# Patient Record
Sex: MALE | Race: WHITE | NOT HISPANIC OR LATINO | Employment: FULL TIME | URBAN - METROPOLITAN AREA
[De-identification: names, ages, dates, MRNs, and addresses within clinical notes are randomized per-mention and may not be internally consistent; named-entity substitution may affect disease eponyms.]

---

## 2017-06-28 ENCOUNTER — GENERIC CONVERSION - ENCOUNTER (OUTPATIENT)
Dept: OTHER | Facility: OTHER | Age: 57
End: 2017-06-28

## 2017-06-29 ENCOUNTER — LAB REQUISITION (OUTPATIENT)
Dept: LAB | Facility: HOSPITAL | Age: 57
End: 2017-06-29
Payer: COMMERCIAL

## 2017-06-29 DIAGNOSIS — N20.1 CALCULUS OF URETER: ICD-10-CM

## 2017-06-29 PROCEDURE — 82360 CALCULUS ASSAY QUANT: CPT | Performed by: UROLOGY

## 2017-07-03 ENCOUNTER — ALLSCRIPTS OFFICE VISIT (OUTPATIENT)
Dept: OTHER | Facility: OTHER | Age: 57
End: 2017-07-03

## 2017-07-03 DIAGNOSIS — Z12.5 ENCOUNTER FOR SCREENING FOR MALIGNANT NEOPLASM OF PROSTATE: ICD-10-CM

## 2017-07-03 DIAGNOSIS — N40.0 ENLARGED PROSTATE WITHOUT LOWER URINARY TRACT SYMPTOMS (LUTS): ICD-10-CM

## 2017-07-03 LAB
BILIRUB UR QL STRIP: NORMAL
CLARITY UR: NORMAL
COLOR UR: YELLOW
GLUCOSE (HISTORICAL): NORMAL
HGB UR QL STRIP.AUTO: NORMAL
KETONES UR STRIP-MCNC: NORMAL MG/DL
LEUKOCYTE ESTERASE UR QL STRIP: NORMAL
NITRITE UR QL STRIP: NORMAL
SP GR UR STRIP.AUTO: 1.02

## 2017-07-06 ENCOUNTER — TRANSCRIBE ORDERS (OUTPATIENT)
Dept: ADMINISTRATIVE | Facility: HOSPITAL | Age: 57
End: 2017-07-06

## 2017-07-06 ENCOUNTER — GENERIC CONVERSION - ENCOUNTER (OUTPATIENT)
Dept: OTHER | Facility: OTHER | Age: 57
End: 2017-07-06

## 2017-07-06 ENCOUNTER — APPOINTMENT (OUTPATIENT)
Dept: LAB | Facility: HOSPITAL | Age: 57
End: 2017-07-06
Payer: COMMERCIAL

## 2017-07-06 DIAGNOSIS — K29.70 GASTRITIS WITHOUT BLEEDING: ICD-10-CM

## 2017-07-06 DIAGNOSIS — I25.10 ATHEROSCLEROTIC HEART DISEASE OF NATIVE CORONARY ARTERY WITHOUT ANGINA PECTORIS: ICD-10-CM

## 2017-07-06 DIAGNOSIS — Z12.5 ENCOUNTER FOR SCREENING FOR MALIGNANT NEOPLASM OF PROSTATE: ICD-10-CM

## 2017-07-06 DIAGNOSIS — R31.29 OTHER MICROSCOPIC HEMATURIA: ICD-10-CM

## 2017-07-06 DIAGNOSIS — E55.9 VITAMIN D DEFICIENCY: ICD-10-CM

## 2017-07-06 DIAGNOSIS — R73.9 HYPERGLYCEMIA: ICD-10-CM

## 2017-07-06 DIAGNOSIS — E78.5 HYPERLIPIDEMIA: ICD-10-CM

## 2017-07-06 DIAGNOSIS — I47.1 SUPRAVENTRICULAR TACHYCARDIA (HCC): ICD-10-CM

## 2017-07-06 LAB
25(OH)D3 SERPL-MCNC: 31.8 NG/ML (ref 30–100)
ALBUMIN SERPL BCP-MCNC: 4 G/DL (ref 3.5–5)
ALP SERPL-CCNC: 83 U/L (ref 46–116)
ALT SERPL W P-5'-P-CCNC: 51 U/L (ref 12–78)
ANION GAP SERPL CALCULATED.3IONS-SCNC: 7 MMOL/L (ref 4–13)
AST SERPL W P-5'-P-CCNC: 22 U/L (ref 5–45)
BILIRUB SERPL-MCNC: 0.7 MG/DL (ref 0.2–1)
BUN SERPL-MCNC: 19 MG/DL (ref 5–25)
CALCIUM SERPL-MCNC: 9 MG/DL (ref 8.3–10.1)
CHLORIDE SERPL-SCNC: 104 MMOL/L (ref 100–108)
CHOLEST SERPL-MCNC: 115 MG/DL (ref 50–200)
CO2 SERPL-SCNC: 30 MMOL/L (ref 21–32)
CREAT SERPL-MCNC: 1.14 MG/DL (ref 0.6–1.3)
ERYTHROCYTE [DISTWIDTH] IN BLOOD BY AUTOMATED COUNT: 13.4 % (ref 11.6–15.1)
EST. AVERAGE GLUCOSE BLD GHB EST-MCNC: 117 MG/DL
GFR SERPL CREATININE-BSD FRML MDRD: >60 ML/MIN/1.73SQ M
GLUCOSE P FAST SERPL-MCNC: 102 MG/DL (ref 65–99)
HBA1C MFR BLD: 5.7 % (ref 4.2–6.3)
HCT VFR BLD AUTO: 45.9 % (ref 36.5–49.3)
HDLC SERPL-MCNC: 43 MG/DL (ref 40–60)
HGB BLD-MCNC: 15.9 G/DL (ref 12–17)
LDLC SERPL CALC-MCNC: 55 MG/DL (ref 0–100)
MCH RBC QN AUTO: 30.9 PG (ref 26.8–34.3)
MCHC RBC AUTO-ENTMCNC: 34.6 G/DL (ref 31.4–37.4)
MCV RBC AUTO: 89 FL (ref 82–98)
PLATELET # BLD AUTO: 194 THOUSANDS/UL (ref 149–390)
PMV BLD AUTO: 10.5 FL (ref 8.9–12.7)
POTASSIUM SERPL-SCNC: 4.3 MMOL/L (ref 3.5–5.3)
PROT SERPL-MCNC: 7.3 G/DL (ref 6.4–8.2)
PSA SERPL-MCNC: 0.4 NG/ML (ref 0–4)
RBC # BLD AUTO: 5.15 MILLION/UL (ref 3.88–5.62)
SODIUM SERPL-SCNC: 141 MMOL/L (ref 136–145)
TRIGL SERPL-MCNC: 86 MG/DL
TSH SERPL DL<=0.05 MIU/L-ACNC: 1.03 UIU/ML (ref 0.36–3.74)
WBC # BLD AUTO: 7.51 THOUSAND/UL (ref 4.31–10.16)

## 2017-07-06 PROCEDURE — 80053 COMPREHEN METABOLIC PANEL: CPT

## 2017-07-06 PROCEDURE — 83036 HEMOGLOBIN GLYCOSYLATED A1C: CPT

## 2017-07-06 PROCEDURE — G0103 PSA SCREENING: HCPCS

## 2017-07-06 PROCEDURE — 36415 COLL VENOUS BLD VENIPUNCTURE: CPT

## 2017-07-06 PROCEDURE — 84443 ASSAY THYROID STIM HORMONE: CPT

## 2017-07-06 PROCEDURE — 85027 COMPLETE CBC AUTOMATED: CPT

## 2017-07-06 PROCEDURE — 82306 VITAMIN D 25 HYDROXY: CPT

## 2017-07-06 PROCEDURE — 80061 LIPID PANEL: CPT

## 2017-07-08 DIAGNOSIS — K29.70 GASTRITIS WITHOUT BLEEDING: ICD-10-CM

## 2017-07-08 DIAGNOSIS — E78.5 HYPERLIPIDEMIA: ICD-10-CM

## 2017-07-08 DIAGNOSIS — R31.29 OTHER MICROSCOPIC HEMATURIA: ICD-10-CM

## 2017-07-08 DIAGNOSIS — Z12.5 ENCOUNTER FOR SCREENING FOR MALIGNANT NEOPLASM OF PROSTATE: ICD-10-CM

## 2017-07-08 DIAGNOSIS — I25.10 ATHEROSCLEROTIC HEART DISEASE OF NATIVE CORONARY ARTERY WITHOUT ANGINA PECTORIS: ICD-10-CM

## 2017-07-08 DIAGNOSIS — E55.9 VITAMIN D DEFICIENCY: ICD-10-CM

## 2017-07-08 DIAGNOSIS — R73.9 HYPERGLYCEMIA: ICD-10-CM

## 2017-07-08 DIAGNOSIS — I47.1 SUPRAVENTRICULAR TACHYCARDIA (HCC): ICD-10-CM

## 2017-07-12 LAB
CA PHOS MFR STONE: 20 %
COLOR STONE: NORMAL
COM MFR STONE: 80 %
COMMENT-STONE3: NORMAL
COMPOSITION: NORMAL
LABORATORY COMMENT REPORT: NORMAL
NIDUS STONE QL: NORMAL
PHOTO: NORMAL
SIZE STONE: NORMAL MM
STONE ANALYSIS-IMP: NORMAL
WT STONE: 8 MG

## 2017-08-08 ENCOUNTER — GENERIC CONVERSION - ENCOUNTER (OUTPATIENT)
Dept: OTHER | Facility: OTHER | Age: 57
End: 2017-08-08

## 2017-12-18 ENCOUNTER — GENERIC CONVERSION - ENCOUNTER (OUTPATIENT)
Dept: FAMILY MEDICINE CLINIC | Facility: HOSPITAL | Age: 57
End: 2017-12-18

## 2017-12-19 ENCOUNTER — GENERIC CONVERSION - ENCOUNTER (OUTPATIENT)
Dept: FAMILY MEDICINE CLINIC | Facility: HOSPITAL | Age: 57
End: 2017-12-19

## 2017-12-19 ENCOUNTER — GENERIC CONVERSION - ENCOUNTER (OUTPATIENT)
Dept: OTHER | Facility: OTHER | Age: 57
End: 2017-12-19

## 2017-12-20 ENCOUNTER — ALLSCRIPTS OFFICE VISIT (OUTPATIENT)
Dept: OTHER | Facility: OTHER | Age: 57
End: 2017-12-20

## 2017-12-20 ENCOUNTER — GENERIC CONVERSION - ENCOUNTER (OUTPATIENT)
Dept: FAMILY MEDICINE CLINIC | Facility: HOSPITAL | Age: 57
End: 2017-12-20

## 2017-12-20 LAB
BILIRUB UR QL STRIP: NEGATIVE
CLARITY UR: NORMAL
COLOR UR: YELLOW
GLUCOSE (HISTORICAL): NEGATIVE
HGB UR QL STRIP.AUTO: NEGATIVE
KETONES UR STRIP-MCNC: NEGATIVE MG/DL
LEUKOCYTE ESTERASE UR QL STRIP: NEGATIVE
NITRITE UR QL STRIP: NEGATIVE
PH UR STRIP.AUTO: 5.5 [PH]
PROT UR STRIP-MCNC: NEGATIVE MG/DL
SP GR UR STRIP.AUTO: >=1.03
UROBILINOGEN UR QL STRIP.AUTO: 0.2

## 2017-12-21 ENCOUNTER — GENERIC CONVERSION - ENCOUNTER (OUTPATIENT)
Dept: FAMILY MEDICINE CLINIC | Facility: HOSPITAL | Age: 57
End: 2017-12-21

## 2017-12-27 ENCOUNTER — GENERIC CONVERSION - ENCOUNTER (OUTPATIENT)
Dept: FAMILY MEDICINE CLINIC | Facility: HOSPITAL | Age: 57
End: 2017-12-27

## 2018-01-13 VITALS
WEIGHT: 219 LBS | BODY MASS INDEX: 35.2 KG/M2 | DIASTOLIC BLOOD PRESSURE: 80 MMHG | SYSTOLIC BLOOD PRESSURE: 120 MMHG | HEIGHT: 66 IN

## 2018-01-14 NOTE — RESULT NOTES
Discussion/Summary   please notify pt that  sugar was slightly elevated at 102 but rest of BW - kidney/liver/thyroid/cholesterol and prostate tests were all wnl, watch sugars/carbs, keep wgt healthy and try and exercise regularly     Verified Results  (1) CBC/ PLT (NO DIFF) [id number] 09:48AM Lorre Matter Order Number: [id number]     Test Name Result Flag Reference   HEMATOCRIT 45 9 %  36 5-49 3   HEMOGLOBIN 15 9 g/dL  12 0-17 0   MCHC 34 6 g/dL  31 4-37 4   MCH 30 9 pg  26 8-34 3   MCV 89 fL  82-98   PLATELET COUNT 785 Thousands/uL  149-390   RBC COUNT 5 15 Million/uL  3 88-5 62   RDW 13 4 %  11 6-15 1   WBC COUNT 7 51 Thousand/uL  4 31-10 16   MPV 10 5 fL  8 9-12 7     (1) COMPREHENSIVE METABOLIC PANEL 44JKJ5777 62:39TY Lorre Matter Order Number: [id number]     Test Name Result Flag Reference   SODIUM 141 mmol/L  136-145   POTASSIUM 4 3 mmol/L  3 5-5 3   CHLORIDE 104 mmol/L  100-108   CARBON DIOXIDE 30 mmol/L  21-32   ANION GAP (CALC) 7 mmol/L  4-13   BLOOD UREA NITROGEN 19 mg/dL  5-25   CREATININE 1 14 mg/dL  0 60-1 30   Standardized to IDMS reference method   CALCIUM 9 0 mg/dL  8 3-10 1   BILI, TOTAL 0 70 mg/dL  0 20-1 00   ALK PHOSPHATAS 83 U/L     ALT (SGPT) 51 U/L  12-78   AST(SGOT) 22 U/L  5-45   ALBUMIN 4 0 g/dL  3 5-5 0   TOTAL PROTEIN 7 3 g/dL  6 4-8 2   eGFR Non-African American      >60 0 ml/min/1 73sq m   [facility] [employer] Disease Education Program recommendations are as follows:  GFR calculation is accurate only with a steady state creatinine  Chronic Kidney disease less than 60 ml/min/1 73 sq  meters  Kidney failure less than 15 ml/min/1 73 sq  meters  GLUCOSE FASTING 102 mg/dL H 65-99     (1) HEMOGLOBIN A1C 62HMD0842 09:48AM Lorre Matter Order Number: [id number]     Test Name Result Flag Reference   HEMOGLOBIN A1C 5 7 %  4 2-6 3   EST  AVG   GLUCOSE 117 mg/dl       (1) LIPID PANEL, FASTING 32FZV4087 09:48AM Matt FREEMAN Order Number: [id number]     Test Name Result Flag Reference   CHOLESTEROL 115 mg/dL     HDL,DIRECT 43 mg/dL  40-60   Specimen collection should occur prior to Metamizole administration due to the potential for falsely depressed results  LDL CHOLESTEROL CALCULATED 55 mg/dL  0-100   Triglyceride:         Normal              <150 mg/dl       Borderline High    150-199 mg/dl       High               200-499 mg/dl       Very High          >499 mg/dl  Cholesterol:         Desirable        <200 mg/dl      Borderline High  200-239 mg/dl      High             >239 mg/dl  HDL Cholesterol:        High    >59 mg/dL      Low     <41 mg/dL  LDL CALCULATED:    This screening LDL is a calculated result  It does not have the accuracy of the Direct Measured LDL in the monitoring of patients with hyperlipidemia and/or statin therapy  Direct Measure LDL (QAQ335) must be ordered separately in these patients  TRIGLYCERIDES 86 mg/dL  <=150   Specimen collection should occur prior to N-Acetylcysteine or Metamizole administration due to the potential for falsely depressed results  (1) TSH WITH FT4 REFLEX 56Myy9616 09:48AM [facility] Order Number: [id number]     Test Name Result Flag Reference   TSH 1 030 uIU/mL  0 358-3 740   Patients undergoing fluorescein dye angiography may retain small amounts of fluorescein in the body for 48-72 hours post procedure  Samples containing fluorescein can produce falsely depressed TSH values  If the patient had this procedure,a specimen should be resubmitted post fluorescein clearance       (1) VITAMIN D 25-HYDROXY 47LWK0861 09:48AM [facility] Order Number: [id number]     Test Name Result Flag Reference   VIT D 25-HYDROX 31 8 ng/mL  30 0-100 0   This assay is a certified procedure of the CDC Vitamin D Standardization Certification Program (VDSCP)     Deficiency <20ng/ml   Insufficiency 20-30ng/ml   Sufficient  ng/ml     *Patients undergoing fluorescein dye angiography may retain small amounts of fluorescein in the body for 48-72 hours post procedure  Samples containing fluorescein can produce falsely elevated Vitamin D values  If the patient had this procedure, a specimen should be resubmitted post fluorescein clearance  (1) PSA (SCREEN) (Dx V76 44 Screen for Prostate Cancer) 62BJS6023 09:48AM Manish Yara Order Number: [id number]     Test Name Result Flag Reference   PROSTATE SPECIFIC ANTIGEN 0 4 ng/mL  0 0-4 0   American Urological Association Guidelines define biochemical recurrence of prostate cancer as a detectable or rising PSA value post-radical prostatectomy that is greater than or equal to 0 2 ng/mL with a second confirmatory level of greater than or equal to 0 2 ng/mL

## 2018-01-15 NOTE — PROGRESS NOTES
Assessment    1  Encounter for preventive health examination (V70 0) (Z00 00)   2  Hyperglycemia (790 29) (R73 9)   3  GERD (gastroesophageal reflux disease) (530 81) (K21 9)   4  Dyslipidemia (272 4) (E78 5)   5  Streptococcus pneumoniae vaccination indicated (V49 89) (Z91 89)   6  Frothy urine (791 9) (R82 99)    Plan  Frothy urine    · (1) URINALYSIS w URINE C/S REFLEX (will reflex a microscopy if leukocytes, occult  blood, or nitrites are not within normal limits); Status:Active; Requested for:15Qve2082;   Streptococcus pneumoniae vaccination indicated    · Pneumo (Pneumovax); INJECT 0 5  ML Intramuscular; To Be Done:  20EFY4275    Discussion/Summary  Impression: health maintenance visit  Currently, he eats a poor diet and has an inadequate exercise regimen  Prostate cancer screening: prostate cancer screening is current  Colorectal cancer screening: colorectal cancer screening is current and colonoscopy is needed every ten years  The immunizations will be given as outlined in the orders  Advice and education were given regarding nutrition, aerobic exercise, weight loss, sunscreen use and seat belt use  Patient discussion: discussed with the patient  BW results d/w pt and all values at goal - no changes in meds; Sugars better and low sugar/carb diet d/w pt in detail - will recheck in 1 yr    Frothy urine - pt very concerned over kidneys - will check UA but reassured kidney tests were nml    GERD - reassured pt that his renal fxn appeared nml and if the Prilosec worked better I would go back to that over the Ranitiidine - he has f/u with GI next week  The patient was counseled regarding diagnostic results, instructions for management, risk factor reductions, impressions  Chief Complaint  PE      History of Present Illness  HM, Adult Male: The patient is being seen for a health maintenance evaluation  The last health maintenance visit was 1 year(s) ago     Social History: Household members include spouse  He is   Work status: working full time  The patient has never smoked cigarettes  He reports rare alcohol use  General Health: The patient's health since the last visit is described as good  He has regular dental visits  He denies vision problems  He denies hearing loss  Immunizations status: not up to date   just saw dentist and had a cavity filled, he notes no vision problems and goes every 2 yrs - due next year, has reading glasses  Lifestyle:  He does not have a healthy diet  He does not exercise regularly  He does not use tobacco  He denies alcohol use  He denies drug use  admits diet has been poor since being home for visit but he states it is well balanced and good when he is in his regular environment in [place], he does no formal exercise but walks a lot in [place]  Reproductive health:  the patient is not sexually active  He denies erectile dysfunction  Screening: Prostate cancer screening includes last prostate-specific antigen testing 2016  Colorectal cancer screening includes last colonoscopy done 2014  Metabolic screening includes lipid profile performed July 2016, glucose screening performed July 2016 and thyroid function test performed July 2016  Cardiovascular risk factors: hypertension, low HDL cholesterol and obesity  General health risks: no elevated prostate-specific antigen and no family history of prostate cancer  Safety elements used: seat belt and sunscreen  Risk findings: no depression symptoms  HPI: He saw Derm and was told he had a precancerous skin lesion and is going tomorrow to have further tx done on it  Pt has been seeing a podiatrist for L foot pain and was told he had plantar fasciitis and was started on Naproxen 500 mg 1 tab PO bid  He notes the med is helping and he is doing stretches       Has been concerned over renal SE with Prilosec saw d/w GI and they changed him to Ranitidine - still having some symptoms with the Ranitidine and has f/u appt with GI next week to discuss further  Does note the Naproxen does upset his stomach when he takes it  Review of Systems    Constitutional: recent weight gain, but no fever and no chills  Eyes: no eyesight problems  ENT: no sore throat and no nasal discharge  Cardiovascular: no chest pain, no palpitations and no extremity edema  Respiratory: no shortness of breath, no cough and no wheezing  Gastrointestinal: no abdominal pain, no constipation and no diarrhea  Genitourinary: urine very frothy when he urinates, but no dysuria  Musculoskeletal: no joint swelling and no joint stiffness  Integumentary: no rashes  Neurological: no headache and no dizziness  Psychiatric: no depression  Endocrine: no muscle weakness and no erectile dysfunction  Hematologic/Lymphatic: no tendency for easy bleeding and no tendency for easy bruising  Active Problems    1  CAD (coronary atherosclerotic disease) (414 00) (I25 10)   2  Cervicalgia (723 1) (M54 2)   3  Colonoscopy (Fiberoptic)   4  Colonoscopy (Fiberoptic) Screening   5  Dyslipidemia (272 4) (E78 5)   6  Edema (782 3) (R60 9)   7  Erectile dysfunction (607 84) (N52 9)   8  Facet hypertrophy of lumbosacral region (724 8) (M47 897)   9  Gastritis (535 50) (K29 70)   10  GERD (gastroesophageal reflux disease) (530 81) (K21 9)   11  Hyperglycemia (790 29) (R73 9)   12  Influenza vaccine needed (V04 81) (Z23)   13  Internal Hemorrhoids (455 0)   14  Loss of appetite (783 0) (R63 0)   15  Low back pain (724 2) (M54 5)   16  Lumbosacral pain (724 2,724 6) (M54 5,M54 89)   17  Microscopic hematuria (599 72) (R31 2)   18  Narrowing of lumbar intervertebral disc space (722 52) (M51 36)   19  Neck muscle spasm (728 85) (M62 838)   20  Obesity (278 00) (E66 9)   21  Osteoarthritis cervical spine (721 0) (M47 812)   22  Paresthesia (782 0) (R20 2)   23  Paroxysmal SVT (supraventricular tachycardia) (427 0) (I47 1)   24  Polyuria (788 42) (R35 8)   25  Rhinitis (472 0) (J31 0)   26  Sacral radiculopathy (724 4) (M54 18)   27  Screening for prostate cancer (V76 44) (Z12 5)   28  Trochanteric bursitis of both hips (726 5) (M70 61,M70 62)   29  Vitamin D deficiency (268 9) (E55 9)    Past Medical History    · History of Abdominal bloating (787 3) (R14 0)   · History of Acute hepatitis A (070 1) (B15 9)   · History of Dry mouth (527 7) (R68 2)   · History of Dysphagia (787 20) (R13 10)   · History of Exposure To Radiation   · History of Hip pain (719 45) (M25 559)   · History of abdominal pain (V13 89) (N54 882)   · History of prostatitis (V13 89) (R78 995)   · History of Left leg pain (729 5) (M79 605)   · History of Nephrolithiasis (V13 01)   · History of Plantar fasciitis (728 71) (M72 2)   · History of Skin rash (782 1) (R21)   · History of Testicular pain (608 9) (N50 8)    Surgical History    · History of Cath Stent Placement   · History of Cath Stent Placement   · History of Complete Colonoscopy   · History of Complete Colonoscopy   · History of Dental Surgery   · History of Diagnostic Esophagogastroduodenoscopy   · History of Diagnostic Esophagogastroduodenoscopy   · History of Needle Biopsy Of Prostate    Family History  Mother    · Family history of Breast Cancer (V16 3)   · Family history of Hyperlipidemia   · Family history of Hypertension (V17 49)   · Family history of Osteoporosis (V17 81)  Father    · Family history of Hypertension (V17 49)  Paternal Grandfather    · Family history of Coronary Artery Disease (V17 49)    Social History    · Marital History - Currently    · Never A Smoker   · Occasional alcohol use   · Working Full Time   ·     Current Meds   1  Allegra Allergy 180 MG Oral Tablet; TAKE 1 TABLET DAILY AS NEEDED FOR   ALLERGIES; Therapy: 54QPV8856 to (Last XE:88MSP8054)  Requested for: 48SVE2502 Ordered   2  Aspirin 81 MG TABS; TAKE 1 TABLET DAILY; Therapy: 01WND3196 to (Evaluate:83See4782);  Last BQ:58YZD0627 Ordered   3  Carafate 1 GM Oral Tablet; TAKE 1 TABLET EVERY 12 HOURS; Therapy: 23Apr2015 to Recorded   4  Clopidogrel Bisulfate 75 MG Oral Tablet; TAKE 1 TABLET EVERY DAY; Therapy: 13ZLO6962 to (Evaluate:58Vwd9838); Last Rx:21Jun2012 Ordered   5  Metoprolol Tartrate 25 MG Oral Tablet; TAKE 1 TABLET TWICE DAILY; Therapy: 48KPB3490 to (Evaluate:12Nov2015)  Requested for: 16Apr2015; Last   Rx:16Apr2015 Ordered   6  Ranitidine HCl - 150 MG Oral Tablet; TAKE ONE (1) TABLET(S) TWICE DAILY; Therapy: 56AQP0837 to (Evaluate:70Ymp3192) Recorded   7  Vitamin D 2000 UNIT Oral Tablet; take 2 tablet daily; Therapy: 50HCX4381 to Recorded   8  Zetia 10 MG Oral Tablet; TAKE 1 TABLET DAILY; Therapy: 19XFR5012 to (Evaluate:16Jun2013)  Requested for: 21Jun2012; Last   Rx:21Jun2012 Ordered    Allergies    1  No Known Drug Allergies    Vitals   Recorded: 50OGX5588 78:93HM   Systolic 318   Diastolic 72   Heart Rate 72   Temperature 97 5 F   Height 5 ft 6 in   Weight 212 lb    BMI Calculated 34 22   BSA Calculated 2 06     Physical Exam    Constitutional   General appearance: No acute distress, well appearing and well nourished  Ears, Nose, Mouth, and Throat   External inspection of ears and nose: Normal     Otoscopic examination: Tympanic membranes translucent with normal light reflex  Canals patent without erythema  Oropharynx: Normal with no erythema, edema, exudate or lesions  Neck   Neck: Supple, symmetric, trachea midline, no masses  Pulmonary   Respiratory effort: No increased work of breathing or signs of respiratory distress  Auscultation of lungs: Clear to auscultation  Cardiovascular   Auscultation of heart: Normal rate and rhythm, normal S1 and S2, no murmurs  Examination of extremities for edema and/or varicosities: Normal     Abdomen   Abdomen: Non-tender, no masses  Lymphatic   Palpation of lymph nodes in neck: No lymphadenopathy      Musculoskeletal   Gait and station: Normal  Psychiatric   Judgment and insight: Normal     Mood and affect: Normal        Health Management  Colonoscopy (Fiberoptic) Screening   COLONOSCOPY; every 10 years; Last 60Roy8692; Next Due: 93BTX8034;  Active    Signatures   Electronically signed by : Femi Melendez DO; Jul 18 2016 10:05AM EST                       (Author)

## 2018-01-16 NOTE — RESULT NOTES
Message   pt aware     Verified Results  (1) URINALYSIS w URINE C/S REFLEX (will reflex a microscopy if leukocytes, occult blood, or nitrites are not within normal limits) 86Uxe7101 10:18AM Ed Mora Order Number: [id number]     Test Name Result Flag Reference   COLOR Yellow     CLARITY Clear     PH UA 5 0  4 5-8 0   LEUKOCYTE ESTERASE UA Negative  Negative   NITRITE UA Negative  Negative   PROTEIN UA Negative mg/dl  Negative   GLUCOSE UA Negative mg/dl  Negative   KETONES UA Negative mg/dl  Negative   UROBILINOGEN UA 0 2 E U /dl  0 2, 1 0 E U /dl   BILIRUBIN UA Negative  Negative   BLOOD UA Trace-Intact A Negative   SPECIFIC GRAVITY UA >=1 030  1 003-1 030   BACTERIA None Seen /hpf  None Seen, Occasional   EPITHELIAL CELLS None Seen /hpf  None Seen, Occasional   RBC UA 0-1 /hpf A None Seen   WBC UA None Seen /hpf  None Seen   MUCOUS THREADS Occasional  Occasional, Moderate, Innumerable       Discussion/Summary   please notify pt that his urine showed no sign of protein in it at all

## 2018-01-17 NOTE — MISCELLANEOUS
Message   Recorded as Task   Date: 08/08/2017 09:47 AM, Created By: Kami Jasso   Task Name: Miscellaneous   Assigned To: Selvin NORRIS,TEAM   Regarding Patient: Henrique Monroy, Status: Active   CommentJola Snellen - 08 Aug 2017 9:47 AM     TASK CREATED  Caller: Self; [phone] (Home); [phone] (Work)  Pt calling from [place] via [employer] for stone analysis- he will be calling back at 10 am our time to speak with triage as it will be 11 pm there  Azeb Roque - 08 Aug 2017 10:32 AM     TASK EDITED  Pt  given results  Copy of analysis and Calcium Oxalate diet mailed to [facility] address  Pt  will not be home from [place] until Dec  for appt  Active Problems    1  Benign prostatic hyperplasia without lower urinary tract symptoms (600 00) (N40 0)   2  BPH (benign prostatic hyperplasia) (600 00) (N40 0)   3  CAD (coronary atherosclerotic disease) (414 00) (I25 10)   4  Calcium kidney stones (592 0) (N20 0)   5  Cervicalgia (723 1) (M54 2)   6  Colonoscopy (Fiberoptic)   7  Colonoscopy (Fiberoptic) Screening   8  Dyslipidemia (272 4) (E78 5)   9  Edema (782 3) (R60 9)   10  Encounter for screening for malignant neoplasm of prostate (V76 44) (Z12 5)   11  Erectile dysfunction (607 84) (N52 9)   12  Facet hypertrophy of lumbosacral region (724 8) (M47 897)   13  Frothy urine (791 9) (R82 99)   14  Gastritis (535 50) (K29 70)   15  GERD (gastroesophageal reflux disease) (530 81) (K21 9)   16  Hyperglycemia (790 29) (R73 9)   17  Influenza vaccine needed (V04 81) (Z23)   18  Internal Hemorrhoids (455 0)   19  Loss of appetite (783 0) (R63 0)   20  Low back pain (724 2) (M54 5)   21  Lumbosacral pain (724 2,724 6) (M54 5)   22  Microscopic hematuria (599 72) (R31 29)   23  Narrowing of lumbar intervertebral disc space (722 52) (M51 36)   24  Neck muscle spasm (728 85) (M62 838)   25  Obesity (278 00) (E66 9)   26  Osteoarthritis cervical spine (721 0) (M47 812)   27   Paresthesia (782  0) (R20 2)   28  Paroxysmal SVT (supraventricular tachycardia) (427 0) (I47 1)   29  Polyuria (788 42) (R35 8)   30  Rhinitis (472 0) (J31 0)   31  Sacral radiculopathy (724 4) (M54 18)   32  Screening for prostate cancer (V76 44) (Z12 5)   33  Streptococcus pneumoniae vaccination indicated (V49 89) (Z91 89)   34  Trochanteric bursitis of both hips (726 5) (M70 61,M70 62)   35  Vitamin D deficiency (268 9) (E55 9)    Current Meds   1  Allegra Allergy 180 MG Oral Tablet (Fexofenadine HCl); TAKE 1 TABLET DAILY AS   NEEDED FOR ALLERGIES; Therapy: 70JUB3393 to (Last WE:88IPS6774)  Requested for: 00STF8838 Ordered   2  Aspirin 81 MG TABS; TAKE 1 TABLET DAILY; Therapy: 64RAR2946 to (Evaluate:94Aex6940); Last Rx:21Jun2012 Ordered   3  Carafate 1 GM Oral Tablet (Sucralfate); TAKE 1 TABLET EVERY 12 HOURS; Therapy: 14Cml5636 to Recorded   4  Clopidogrel Bisulfate 75 MG Oral Tablet (Plavix); TAKE 1 TABLET EVERY DAY; Therapy: 51HXD3758 to (Evaluate:20Gri2162); Last Rx:21Jun2012 Ordered   5  Metoprolol Tartrate 25 MG Oral Tablet; TAKE 1 TABLET TWICE DAILY; Therapy: 04QVD5820 to (Evaluate:12Nov2015)  Requested for: 16Apr2015; Last   Rx:16Apr2015 Ordered   6  RaNITidine HCl - 150 MG Oral Tablet; TAKE ONE (1) TABLET(S) TWICE DAILY; Therapy: 23GLX5157 to (Evaluate:53Zct8963) Recorded   7  Vitamin D 2000 UNIT Oral Tablet; take 2 tablet daily; Therapy: 98CMC2784 to Recorded   8  Zetia 10 MG Oral Tablet (Ezetimibe); TAKE 1 TABLET DAILY; Therapy: 10ZFO1793 to (Evaluate:59Yrr4079)  Requested for: 21Jun2012; Last   Rx:21Jun2012 Ordered    Allergies    1   No Known Drug Allergies    Signatures   Electronically signed by : José Miguel Madrigal RN; Aug  8 2017 10:33AM EST                       (Author)

## 2018-01-22 VITALS
WEIGHT: 227 LBS | DIASTOLIC BLOOD PRESSURE: 78 MMHG | HEIGHT: 66 IN | SYSTOLIC BLOOD PRESSURE: 142 MMHG | BODY MASS INDEX: 36.48 KG/M2

## 2018-06-27 ENCOUNTER — APPOINTMENT (OUTPATIENT)
Dept: LAB | Facility: HOSPITAL | Age: 58
End: 2018-06-27
Attending: UROLOGY
Payer: COMMERCIAL

## 2018-06-27 DIAGNOSIS — N40.0 ENLARGED PROSTATE WITHOUT LOWER URINARY TRACT SYMPTOMS (LUTS): ICD-10-CM

## 2018-06-27 LAB — PSA SERPL-MCNC: 0.3 NG/ML (ref 0–4)

## 2018-06-27 PROCEDURE — G0103 PSA SCREENING: HCPCS

## 2018-06-27 PROCEDURE — 36415 COLL VENOUS BLD VENIPUNCTURE: CPT

## 2018-06-29 RX ORDER — OMEPRAZOLE 20 MG/1
CAPSULE, DELAYED RELEASE ORAL
COMMUNITY

## 2018-06-29 RX ORDER — ONDANSETRON 4 MG/1
TABLET, FILM COATED ORAL
COMMUNITY
End: 2018-07-30

## 2018-06-29 RX ORDER — FEXOFENADINE HCL 180 MG/1
1 TABLET ORAL DAILY PRN
COMMUNITY
Start: 2011-06-24

## 2018-06-29 RX ORDER — OMEGA-3 FATTY ACIDS/FISH OIL 300-1000MG
CAPSULE ORAL
COMMUNITY
End: 2018-07-05 | Stop reason: ALTCHOICE

## 2018-06-29 RX ORDER — OXYCODONE HYDROCHLORIDE 20 MG/1
TABLET ORAL
COMMUNITY
End: 2018-07-30

## 2018-06-29 RX ORDER — EZETIMIBE 10 MG/1
1 TABLET ORAL DAILY
COMMUNITY
Start: 2012-06-21

## 2018-06-29 RX ORDER — CLOPIDOGREL BISULFATE 75 MG/1
1 TABLET ORAL DAILY
COMMUNITY
Start: 2012-06-21

## 2018-06-29 RX ORDER — ATORVASTATIN CALCIUM 40 MG/1
TABLET, FILM COATED ORAL
COMMUNITY

## 2018-07-05 ENCOUNTER — OFFICE VISIT (OUTPATIENT)
Dept: UROLOGY | Facility: MEDICAL CENTER | Age: 58
End: 2018-07-05
Payer: COMMERCIAL

## 2018-07-05 VITALS
HEIGHT: 66 IN | DIASTOLIC BLOOD PRESSURE: 64 MMHG | WEIGHT: 220 LBS | BODY MASS INDEX: 35.36 KG/M2 | SYSTOLIC BLOOD PRESSURE: 120 MMHG

## 2018-07-05 DIAGNOSIS — N20.0 KIDNEY STONES: ICD-10-CM

## 2018-07-05 DIAGNOSIS — Z12.5 ENCOUNTER FOR PROSTATE CANCER SCREENING: ICD-10-CM

## 2018-07-05 DIAGNOSIS — N40.2 PROSTATE NODULE: Primary | ICD-10-CM

## 2018-07-05 LAB
SL AMB  POCT GLUCOSE, UA: NEGATIVE
SL AMB LEUKOCYTE ESTERASE,UA: NEGATIVE
SL AMB POCT BILIRUBIN,UA: NEGATIVE
SL AMB POCT BLOOD,UA: ABNORMAL
SL AMB POCT CLARITY,UA: CLEAR
SL AMB POCT COLOR,UA: ABNORMAL
SL AMB POCT KETONES,UA: NEGATIVE
SL AMB POCT NITRITE,UA: NEGATIVE
SL AMB POCT PH,UA: 5
SL AMB POCT SPECIFIC GRAVITY,UA: >=1.03
SL AMB POCT URINE PROTEIN: NEGATIVE
SL AMB POCT UROBILINOGEN: 0.2

## 2018-07-05 PROCEDURE — 99214 OFFICE O/P EST MOD 30 MIN: CPT | Performed by: UROLOGY

## 2018-07-05 PROCEDURE — 81003 URINALYSIS AUTO W/O SCOPE: CPT | Performed by: UROLOGY

## 2018-07-05 NOTE — LETTER
2018     Mell Kirk, [address]  [address]  [address] [address]    Patient: Tomeka Marcial   YOB: 1960   Date of Visit: 2018       Dear Dr Hoyos Wakpala: Thank you for referring Suzie Ziegler to me for evaluation  Below are my notes for this consultation  If you have questions, please do not hesitate to call me  I look forward to following your patient along with you  Sincerely,        Jarad Funk MD        CC: No Recipients  Jarad Funk MD  2018 11:12 AM  Sign at close encounter  [address] [facility]  [address], [address] [place] [id number]  www  [facility]  org      NAME: Tomeka Marcial  AGE: 61 y o  SEX: male  : 1960   MRN: [id number]    DATE: 2018  TIME: 10:59 AM    Assessment and Plan:  Left nephrolithiasis:  Had punctate stones on CT scan last year, we will check a renal ultrasound to make sure they have not grown  Results to him  History of a prostate nodule:  I have never felt this on exam and he underwent biopsy for lesion 3 years ago which was negative  His PSA has remained low and stable and I do not think he has prostate cancer  BPH:  Has enlarged median lobe on cystoscopy but no symptoms  No treatment obviously needed for this  Check renal ultrasound, sent results to him and see him in a year with a PSA  Chief Complaint     Chief Complaint   Patient presents with    Nodular Prostate     6 mo ck       History of Present Illness   47year-old status patient with a history of BPH, at 1 time he had a nodular prostate found on exam and underwent biopsy for this in   He also has a history of nephrolithiasis and occasionally passes the stone  He has undergone cystoscopy for microscopic hematuria in the past which showed an enlarged median lobe  His PSA remains low and stable at 0 3 as of 2018    Today's urine shows trace blood on the dipstick  No gross hematuria  Last stone he passed was a year ago  The following portions of the patient's history were reviewed and updated as appropriate: allergies, current medications, past family history, past medical history, past social history, past surgical history and problem list     Review of Systems   Review of Systems   Genitourinary: Negative  Active Problem List   There is no problem list on file for this patient  Objective   /64   Ht 5' 6" (1 676 m)   Wt 99 8 kg (220 lb)   BMI 35 51 kg/m²      Physical Exam   Constitutional: He appears well-developed and well-nourished  HENT:   Head: Normocephalic and atraumatic  Eyes: EOM are normal    Neck: Normal range of motion  Pulmonary/Chest: Effort normal    Genitourinary: Rectum normal and prostate normal    Genitourinary Comments: Prostate is about 40gm, smooth, symmetric benign             Current Medications     Current Outpatient Prescriptions:     aspirin 81 MG tablet, Take 1 tablet by mouth daily, Disp: , Rfl:     atorvastatin (LIPITOR) 40 mg tablet, Take by mouth, Disp: , Rfl:     clopidogrel (PLAVIX) 75 mg tablet, Take 1 tablet by mouth daily, Disp: , Rfl:     ezetimibe (ZETIA) 10 mg tablet, Take 1 tablet by mouth daily, Disp: , Rfl:     fexofenadine (ALLEGRA) 180 MG tablet, Take 1 tablet by mouth daily as needed, Disp: , Rfl:     metoprolol tartrate (LOPRESSOR) 25 mg tablet, Take 1 tablet by mouth 2 (two) times a day, Disp: , Rfl:     omeprazole (PRILOSEC) 20 mg delayed release capsule, Take by mouth, Disp: , Rfl:     ondansetron (ZOFRAN) 4 mg tablet, Take by mouth, Disp: , Rfl:     oxyCODONE (ROXICODONE) 20 MG TABS, Take by mouth, Disp: , Rfl:         Ca Santos MD

## 2018-07-05 NOTE — PROGRESS NOTES
[address] [facility]  [facility]  [address] [facility]  [place], [address] [facility]  [phone]  www  [facility]  org      NAME: Ike Mahoney  AGE: 61 y o  SEX: male  : 1960   MRN: [id number]    DATE: 2018  TIME: 10:59 AM    Assessment and Plan:  Left nephrolithiasis:  Had punctate stones on CT scan last year, we will check a renal ultrasound to make sure they have not grown  Results to him  History of a prostate nodule:  I have never felt this on exam and he underwent biopsy for lesion 3 years ago which was negative  His PSA has remained low and stable and I do not think he has prostate cancer  BPH:  Has enlarged median lobe on cystoscopy but no symptoms  No treatment obviously needed for this  Check renal ultrasound, sent results to him and see him in a year with a PSA  Chief Complaint     Chief Complaint   Patient presents with    Nodular Prostate     6 mo ck       History of Present Illness   47year-old status patient with a history of BPH, at 1 time he had a nodular prostate found on exam and underwent biopsy for this in   He also has a history of nephrolithiasis and occasionally passes the stone  He has undergone cystoscopy for microscopic hematuria in the past which showed an enlarged median lobe  His PSA remains low and stable at 0 3 as of 2018  Today's urine shows trace blood on the dipstick  No gross hematuria  Last stone he passed was a year ago  The following portions of the patient's history were reviewed and updated as appropriate: allergies, current medications, past family history, past medical history, past social history, past surgical history and problem list     Review of Systems   Review of Systems   Genitourinary: Negative  Active Problem List   There is no problem list on file for this patient        Objective   /64   Ht 5' 6" (1 676 m)   Wt 99 8 kg (220 lb)   BMI 35 51 kg/m² Physical Exam   Constitutional: He appears well-developed and well-nourished  HENT:   Head: Normocephalic and atraumatic  Eyes: EOM are normal    Neck: Normal range of motion  Pulmonary/Chest: Effort normal    Genitourinary: Rectum normal and prostate normal    Genitourinary Comments: Prostate is about 40gm, smooth, symmetric benign             Current Medications     Current Outpatient Prescriptions:     aspirin 81 MG tablet, Take 1 tablet by mouth daily, Disp: , Rfl:     atorvastatin (LIPITOR) 40 mg tablet, Take by mouth, Disp: , Rfl:     clopidogrel (PLAVIX) 75 mg tablet, Take 1 tablet by mouth daily, Disp: , Rfl:     ezetimibe (ZETIA) 10 mg tablet, Take 1 tablet by mouth daily, Disp: , Rfl:     fexofenadine (ALLEGRA) 180 MG tablet, Take 1 tablet by mouth daily as needed, Disp: , Rfl:     metoprolol tartrate (LOPRESSOR) 25 mg tablet, Take 1 tablet by mouth 2 (two) times a day, Disp: , Rfl:     omeprazole (PRILOSEC) 20 mg delayed release capsule, Take by mouth, Disp: , Rfl:     ondansetron (ZOFRAN) 4 mg tablet, Take by mouth, Disp: , Rfl:     oxyCODONE (ROXICODONE) 20 MG TABS, Take by mouth, Disp: , Rfl:         Jarad Funk MD

## 2018-07-06 ENCOUNTER — HOSPITAL ENCOUNTER (OUTPATIENT)
Dept: ULTRASOUND IMAGING | Facility: HOSPITAL | Age: 58
Discharge: HOME | End: 2018-07-06
Attending: UROLOGY
Payer: COMMERCIAL

## 2018-07-06 DIAGNOSIS — N20.0 KIDNEY STONES: ICD-10-CM

## 2018-07-06 PROCEDURE — 76770 US EXAM ABDO BACK WALL COMP: CPT

## 2018-07-30 ENCOUNTER — OFFICE VISIT (OUTPATIENT)
Dept: FAMILY MEDICINE CLINIC | Facility: HOSPITAL | Age: 58
End: 2018-07-30
Payer: COMMERCIAL

## 2018-07-30 VITALS
HEIGHT: 66 IN | BODY MASS INDEX: 35.84 KG/M2 | TEMPERATURE: 97.8 F | DIASTOLIC BLOOD PRESSURE: 70 MMHG | SYSTOLIC BLOOD PRESSURE: 118 MMHG | WEIGHT: 223 LBS | HEART RATE: 70 BPM

## 2018-07-30 DIAGNOSIS — L40.9 PSORIASIS: ICD-10-CM

## 2018-07-30 DIAGNOSIS — E55.9 VITAMIN D DEFICIENCY: ICD-10-CM

## 2018-07-30 DIAGNOSIS — I25.10 ATHEROSCLEROSIS OF NATIVE CORONARY ARTERY OF NATIVE HEART WITHOUT ANGINA PECTORIS: ICD-10-CM

## 2018-07-30 DIAGNOSIS — R73.9 HYPERGLYCEMIA: ICD-10-CM

## 2018-07-30 DIAGNOSIS — R19.7 DIARRHEA, UNSPECIFIED TYPE: Primary | ICD-10-CM

## 2018-07-30 DIAGNOSIS — E78.5 DYSLIPIDEMIA: ICD-10-CM

## 2018-07-30 PROBLEM — N20.0 CALCIUM KIDNEY STONES: Status: ACTIVE | Noted: 2017-07-03

## 2018-07-30 PROBLEM — N40.0 BENIGN PROSTATIC HYPERPLASIA WITHOUT LOWER URINARY TRACT SYMPTOMS: Status: ACTIVE | Noted: 2017-07-03

## 2018-07-30 PROBLEM — N41.0 ACUTE PROSTATITIS: Status: RESOLVED | Noted: 2018-07-30 | Resolved: 2018-07-30

## 2018-07-30 PROCEDURE — 99214 OFFICE O/P EST MOD 30 MIN: CPT | Performed by: INTERNAL MEDICINE

## 2018-07-30 PROCEDURE — 3008F BODY MASS INDEX DOCD: CPT | Performed by: INTERNAL MEDICINE

## 2018-07-30 PROCEDURE — 1036F TOBACCO NON-USER: CPT | Performed by: INTERNAL MEDICINE

## 2018-07-30 RX ORDER — TRIAMCINOLONE ACETONIDE 1 MG/G
1 CREAM TOPICAL 2 TIMES DAILY PRN
Refills: 0 | COMMUNITY
Start: 2018-07-19 | End: 2018-12-27

## 2018-07-30 NOTE — ASSESSMENT & PLAN NOTE
Just dx by Kunal Steele and given steroid cream, states he was told he had psoriatic arthritis and is requesting rheum labs - order given, may need rheum eval if joint pains worsen

## 2018-07-30 NOTE — PROGRESS NOTES
Assessment/Plan:    CAD (coronary atherosclerotic disease)  Pt states he just saw Cardio - due for BW, con't ASA/Plavix/Lipitor/metoprolol    Psoriasis  Just dx by Jeff العراقي and given steroid cream, states he was told he had psoriatic arthritis and is requesting rheum labs - order given, may need rheum eval if joint pains worsen    Dyslipidemia  Due for FLP - order given, con't current Lipitor and Zetia for now    Hyperglycemia  Due for labs - order given    Vitamin D deficiency  Due for labs - order given       Diagnoses and all orders for this visit:    Diarrhea, unspecified type  -     CBC and differential; Future  -     Comprehensive metabolic panel; Future  -     HEMOGLOBIN A1C W/ EAG ESTIMATION; Future  -     Lipid panel; Future  -     Vitamin D 25 hydroxy; Future  -     TSH, 3rd generation with Free T4 reflex; Future  -     Sedimentation rate, automated; Future  -     C-reactive protein; Future  -     RF Screen w/ Reflex to Titer; Future  -     Cyclic citrul peptide antibody, IgG; Future  -     TOREY Screen w/ Reflex to Titer/Pattern; Future    Psoriasis  -     CBC and differential; Future  -     Comprehensive metabolic panel; Future  -     HEMOGLOBIN A1C W/ EAG ESTIMATION; Future  -     Lipid panel; Future  -     Vitamin D 25 hydroxy; Future  -     TSH, 3rd generation with Free T4 reflex; Future  -     Sedimentation rate, automated; Future  -     C-reactive protein; Future  -     RF Screen w/ Reflex to Titer; Future  -     Cyclic citrul peptide antibody, IgG; Future  -     TOREY Screen w/ Reflex to Titer/Pattern; Future    Atherosclerosis of native coronary artery of native heart without angina pectoris  -     CBC and differential; Future  -     Comprehensive metabolic panel; Future  -     HEMOGLOBIN A1C W/ EAG ESTIMATION; Future  -     Lipid panel; Future  -     Vitamin D 25 hydroxy; Future  -     TSH, 3rd generation with Free T4 reflex; Future  -     Sedimentation rate, automated;  Future  -     C-reactive protein; Future  -     RF Screen w/ Reflex to Titer; Future  -     Cyclic citrul peptide antibody, IgG; Future  -     TOREY Screen w/ Reflex to Titer/Pattern; Future    Dyslipidemia  -     CBC and differential; Future  -     Comprehensive metabolic panel; Future  -     HEMOGLOBIN A1C W/ EAG ESTIMATION; Future  -     Lipid panel; Future  -     Vitamin D 25 hydroxy; Future  -     TSH, 3rd generation with Free T4 reflex; Future  -     Sedimentation rate, automated; Future  -     C-reactive protein; Future  -     RF Screen w/ Reflex to Titer; Future  -     Cyclic citrul peptide antibody, IgG; Future  -     TOREY Screen w/ Reflex to Titer/Pattern; Future    Hyperglycemia  -     CBC and differential; Future  -     Comprehensive metabolic panel; Future  -     HEMOGLOBIN A1C W/ EAG ESTIMATION; Future  -     Lipid panel; Future  -     Vitamin D 25 hydroxy; Future  -     TSH, 3rd generation with Free T4 reflex; Future  -     Sedimentation rate, automated; Future  -     C-reactive protein; Future  -     RF Screen w/ Reflex to Titer; Future  -     Cyclic citrul peptide antibody, IgG; Future  -     TOREY Screen w/ Reflex to Titer/Pattern; Future    Vitamin D deficiency  -     CBC and differential; Future  -     Comprehensive metabolic panel; Future  -     HEMOGLOBIN A1C W/ EAG ESTIMATION; Future  -     Lipid panel; Future  -     Vitamin D 25 hydroxy; Future  -     TSH, 3rd generation with Free T4 reflex; Future  -     Sedimentation rate, automated; Future  -     C-reactive protein; Future  -     RF Screen w/ Reflex to Titer; Future  -     Cyclic citrul peptide antibody, IgG; Future  -     TOREY Screen w/ Reflex to Titer/Pattern; Future    Other orders  -     triamcinolone (KENALOG) 0 1 % cream; Apply 1 application topically 2 (two) times a day as needed To affected area      [place] 2014 - good for 10 yrs    EGD Dec 2017 - 3 yr repeat d/t [facility]    Subjective:      Patient ID: Julian Brannon is a 61 y o  male      HPI  Pt here for "loose stools"  Pt states last week he started to have loose stools "but not diarrhea"  He states he would eat anything and it would "go right through me" and he would have loose stools  He denies specific foods that were a trigger  He notes no abd pain/N/V/blood it the stool/F/C  He notes no new meds/recent travel/sick contacts  He states stools are slowly getting more formed and the loose stools are less frequent  He has not used anything for the loose stools  Pt saw a dermatologist recently and was told he had "psoriatic arthritis"  He notes some scaling to the skin and some intermittent joint pains  He notes the pain is not worse in the am when it does rarely occur  He is asking for a "rheum panel"  He notes no family h/o rheumatoid arthritis or psoriasis  He was given a cream to use for the rash  He is due for his fasting labs    EGD 12/17 - [facility] - repeat 3 yrs    [place] 2014 - good for 10 yrs    Review of Systems   Constitutional: Negative for chills and fever  HENT: Negative for congestion and sinus pain  Eyes: Negative for pain and redness  Respiratory: Positive for cough  Negative for chest tightness and shortness of breath  Cardiovascular: Negative for chest pain, palpitations and leg swelling  Gastrointestinal: Positive for diarrhea  Negative for abdominal pain, blood in stool, constipation, nausea and vomiting  Endocrine: Negative for polydipsia and polyuria  Genitourinary: Negative for difficulty urinating and dysuria  Musculoskeletal: Negative for arthralgias and myalgias  Skin: Negative for rash and wound  See above HPI   Neurological: Negative for dizziness and headaches  Hematological: Does not bruise/bleed easily  Psychiatric/Behavioral: Negative for behavioral problems and confusion           Objective:    /70   Pulse 70   Temp 97 8 °F (36 6 °C)   Ht 5' 6" (1 676 m)   Wt 101 kg (223 lb)   BMI 35 99 kg/m²      Physical Exam   Constitutional: He appears well-developed and well-nourished  No distress  HENT:   Head: Normocephalic and atraumatic  Eyes: Conjunctivae are normal  Right eye exhibits no discharge  Left eye exhibits no discharge  Neck: Neck supple  No tracheal deviation present  Cardiovascular: Normal rate and regular rhythm  No murmur heard  Pulmonary/Chest: Effort normal and breath sounds normal  No respiratory distress  He has no wheezes  He has no rales  Abdominal: Soft  He exhibits no distension  There is no tenderness  There is no guarding  Musculoskeletal: He exhibits no edema  Neurological: He is alert  He exhibits normal muscle tone  Skin: Skin is warm and dry  No rash noted  Psychiatric: He has a normal mood and affect  His behavior is normal    Nursing note and vitals reviewed

## 2018-08-04 ENCOUNTER — APPOINTMENT (OUTPATIENT)
Dept: LAB | Facility: HOSPITAL | Age: 58
End: 2018-08-04
Payer: COMMERCIAL

## 2018-08-04 DIAGNOSIS — E55.9 VITAMIN D DEFICIENCY: ICD-10-CM

## 2018-08-04 DIAGNOSIS — I25.10 ATHEROSCLEROSIS OF NATIVE CORONARY ARTERY OF NATIVE HEART WITHOUT ANGINA PECTORIS: ICD-10-CM

## 2018-08-04 DIAGNOSIS — R73.9 HYPERGLYCEMIA: ICD-10-CM

## 2018-08-04 DIAGNOSIS — R19.7 DIARRHEA, UNSPECIFIED TYPE: ICD-10-CM

## 2018-08-04 DIAGNOSIS — L40.9 PSORIASIS: ICD-10-CM

## 2018-08-04 DIAGNOSIS — E78.5 DYSLIPIDEMIA: ICD-10-CM

## 2018-08-04 LAB
25(OH)D3 SERPL-MCNC: 21.2 NG/ML (ref 30–100)
ALBUMIN SERPL BCP-MCNC: 3.8 G/DL (ref 3.5–5)
ALP SERPL-CCNC: 87 U/L (ref 46–116)
ALT SERPL W P-5'-P-CCNC: 50 U/L (ref 12–78)
ANION GAP SERPL CALCULATED.3IONS-SCNC: 8 MMOL/L (ref 4–13)
AST SERPL W P-5'-P-CCNC: 23 U/L (ref 5–45)
BASOPHILS # BLD AUTO: 0.02 THOUSANDS/ΜL (ref 0–0.1)
BASOPHILS NFR BLD AUTO: 0 % (ref 0–1)
BILIRUB SERPL-MCNC: 0.7 MG/DL (ref 0.2–1)
BUN SERPL-MCNC: 11 MG/DL (ref 5–25)
CALCIUM SERPL-MCNC: 8.8 MG/DL (ref 8.3–10.1)
CHLORIDE SERPL-SCNC: 104 MMOL/L (ref 100–108)
CO2 SERPL-SCNC: 28 MMOL/L (ref 21–32)
CREAT SERPL-MCNC: 1.01 MG/DL (ref 0.6–1.3)
CRP SERPL QL: <3 MG/L
EOSINOPHIL # BLD AUTO: 0.31 THOUSAND/ΜL (ref 0–0.61)
EOSINOPHIL NFR BLD AUTO: 5 % (ref 0–6)
ERYTHROCYTE [DISTWIDTH] IN BLOOD BY AUTOMATED COUNT: 13.8 % (ref 11.6–15.1)
ERYTHROCYTE [SEDIMENTATION RATE] IN BLOOD: 2 MM/HOUR (ref 0–10)
EST. AVERAGE GLUCOSE BLD GHB EST-MCNC: 123 MG/DL
GFR SERPL CREATININE-BSD FRML MDRD: 82 ML/MIN/1.73SQ M
GLUCOSE P FAST SERPL-MCNC: 105 MG/DL (ref 65–99)
HBA1C MFR BLD: 5.9 % (ref 4.2–6.3)
HCT VFR BLD AUTO: 46.3 % (ref 36.5–49.3)
HGB BLD-MCNC: 16.3 G/DL (ref 12–17)
IMM GRANULOCYTES # BLD AUTO: 0.02 THOUSAND/UL (ref 0–0.2)
IMM GRANULOCYTES NFR BLD AUTO: 0 % (ref 0–2)
LYMPHOCYTES # BLD AUTO: 1.81 THOUSANDS/ΜL (ref 0.6–4.47)
LYMPHOCYTES NFR BLD AUTO: 26 % (ref 14–44)
MCH RBC QN AUTO: 30.7 PG (ref 26.8–34.3)
MCHC RBC AUTO-ENTMCNC: 35.2 G/DL (ref 31.4–37.4)
MCV RBC AUTO: 87 FL (ref 82–98)
MONOCYTES # BLD AUTO: 0.6 THOUSAND/ΜL (ref 0.17–1.22)
MONOCYTES NFR BLD AUTO: 9 % (ref 4–12)
NEUTROPHILS # BLD AUTO: 4.18 THOUSANDS/ΜL (ref 1.85–7.62)
NEUTS SEG NFR BLD AUTO: 60 % (ref 43–75)
PLATELET # BLD AUTO: 200 THOUSANDS/UL (ref 149–390)
PMV BLD AUTO: 10 FL (ref 8.9–12.7)
POTASSIUM SERPL-SCNC: 4.2 MMOL/L (ref 3.5–5.3)
PROT SERPL-MCNC: 7.3 G/DL (ref 6.4–8.2)
RBC # BLD AUTO: 5.31 MILLION/UL (ref 3.88–5.62)
SODIUM SERPL-SCNC: 140 MMOL/L (ref 136–145)
TSH SERPL DL<=0.05 MIU/L-ACNC: 1.1 UIU/ML (ref 0.36–3.74)
WBC # BLD AUTO: 6.94 THOUSAND/UL (ref 4.31–10.16)

## 2018-08-04 PROCEDURE — 36415 COLL VENOUS BLD VENIPUNCTURE: CPT

## 2018-08-04 PROCEDURE — 80053 COMPREHEN METABOLIC PANEL: CPT

## 2018-08-04 PROCEDURE — 85025 COMPLETE CBC W/AUTO DIFF WBC: CPT

## 2018-08-04 PROCEDURE — 86430 RHEUMATOID FACTOR TEST QUAL: CPT

## 2018-08-04 PROCEDURE — 82306 VITAMIN D 25 HYDROXY: CPT

## 2018-08-04 PROCEDURE — 85652 RBC SED RATE AUTOMATED: CPT

## 2018-08-04 PROCEDURE — 86200 CCP ANTIBODY: CPT

## 2018-08-04 PROCEDURE — 86038 ANTINUCLEAR ANTIBODIES: CPT

## 2018-08-04 PROCEDURE — 83036 HEMOGLOBIN GLYCOSYLATED A1C: CPT

## 2018-08-04 PROCEDURE — 86140 C-REACTIVE PROTEIN: CPT

## 2018-08-04 PROCEDURE — 84443 ASSAY THYROID STIM HORMONE: CPT

## 2018-08-06 DIAGNOSIS — I10 ESSENTIAL HYPERTENSION: Primary | ICD-10-CM

## 2018-08-06 LAB
RHEUMATOID FACT SER QL LA: NEGATIVE
RYE IGE QN: NEGATIVE

## 2018-08-06 RX ORDER — METOPROLOL SUCCINATE 50 MG/1
25 TABLET, EXTENDED RELEASE ORAL DAILY
Qty: 30 TABLET | Refills: 0 | Status: SHIPPED | OUTPATIENT
Start: 2018-08-06 | End: 2018-12-20

## 2018-08-07 DIAGNOSIS — R73.9 HYPERGLYCEMIA: ICD-10-CM

## 2018-08-07 DIAGNOSIS — E55.9 VITAMIN D DEFICIENCY: Primary | ICD-10-CM

## 2018-08-07 LAB — CCP IGA+IGG SERPL IA-ACNC: 4 UNITS (ref 0–19)

## 2018-12-20 ENCOUNTER — OFFICE VISIT (OUTPATIENT)
Dept: FAMILY MEDICINE CLINIC | Facility: HOSPITAL | Age: 58
End: 2018-12-20
Payer: COMMERCIAL

## 2018-12-20 VITALS
SYSTOLIC BLOOD PRESSURE: 120 MMHG | HEIGHT: 66 IN | HEART RATE: 74 BPM | DIASTOLIC BLOOD PRESSURE: 72 MMHG | TEMPERATURE: 98 F | BODY MASS INDEX: 34.39 KG/M2 | WEIGHT: 214 LBS

## 2018-12-20 DIAGNOSIS — J20.8 ACUTE BRONCHITIS, BACTERIAL: Primary | ICD-10-CM

## 2018-12-20 DIAGNOSIS — B96.89 ACUTE BRONCHITIS, BACTERIAL: Primary | ICD-10-CM

## 2018-12-20 PROBLEM — Z92.3: Status: RESOLVED | Noted: 2018-12-20 | Resolved: 2018-12-20

## 2018-12-20 PROBLEM — M72.2 PLANTAR FASCIITIS: Status: RESOLVED | Noted: 2018-12-20 | Resolved: 2018-12-20

## 2018-12-20 PROBLEM — B15.9 ACUTE HEPATITIS A: Status: RESOLVED | Noted: 2018-12-20 | Resolved: 2018-12-20

## 2018-12-20 PROCEDURE — 1036F TOBACCO NON-USER: CPT | Performed by: INTERNAL MEDICINE

## 2018-12-20 PROCEDURE — 3008F BODY MASS INDEX DOCD: CPT | Performed by: INTERNAL MEDICINE

## 2018-12-20 PROCEDURE — 99213 OFFICE O/P EST LOW 20 MIN: CPT | Performed by: INTERNAL MEDICINE

## 2018-12-20 RX ORDER — BENZONATATE 100 MG/1
100 CAPSULE ORAL 3 TIMES DAILY PRN
Qty: 30 CAPSULE | Refills: 0 | Status: SHIPPED | OUTPATIENT
Start: 2018-12-20 | End: 2018-12-27

## 2018-12-20 RX ORDER — METOPROLOL SUCCINATE 50 MG/1
75 TABLET, EXTENDED RELEASE ORAL DAILY
Refills: 0 | COMMUNITY
Start: 2018-12-18

## 2018-12-20 RX ORDER — CEFUROXIME AXETIL 500 MG/1
500 TABLET ORAL EVERY 12 HOURS SCHEDULED
Qty: 14 TABLET | Refills: 0 | Status: SHIPPED | OUTPATIENT
Start: 2018-12-20 | End: 2018-12-27

## 2018-12-20 NOTE — PROGRESS NOTES
Assessment/Plan:     Diagnoses and all orders for this visit:    Acute bronchitis, bacterial  Comments:  D/t duration of symptoms w/o significant improvement will start Ceftin bid x 7 days and Tessalon Perles for cough, call with new/worse symptoms or if not better at all by end of abx, did d/w pt that cough can last 4-6 wks  Orders:  -     cefuroxime (CEFTIN) 500 mg tablet; Take 1 tablet (500 mg total) by mouth every 12 (twelve) hours for 7 days  -     benzonatate (TESSALON PERLES) 100 mg capsule; Take 1 capsule (100 mg total) by mouth 3 (three) times a day as needed for cough for up to 10 days    Other orders  -     metoprolol succinate (TOPROL-XL) 50 mg 24 hr tablet; Take 75 mg by mouth daily          Subjective:      Patient ID: Trina Mendoza is a 60 y o  male  HPI Pt here with c/o not feeling well x 3 wks  He notes some chills and cough and runny nose  He took Tylenol for his symptoms and pushed through as he had concerts coming up  He had an episode of coughing up bright red blood last Tuesday x 3 episodes  He has had no reoccurrence of blood in phlegm since then  Took 2 days off and felt better but was never back to baseline  [place] and started to feel poorly again - still with chills/congestion/cough  He notes no N/V/D/urinary symptoms  He notes some body aches and joint pains  He is coughing up phlegm  He notes no SOB or wheezing  He notes no rashes/sinus pain or pressure  He is currently using only Tylenol for his symptoms  He has had sick contacts  Review of Systems   Constitutional: Positive for chills and fatigue  Negative for fever  HENT: Positive for congestion  Negative for ear pain, sinus pain, sinus pressure and sore throat  Eyes: Negative for redness  Respiratory: Positive for cough  Negative for chest tightness and shortness of breath  Gastrointestinal: Negative for diarrhea, nausea and vomiting     Genitourinary: Negative for difficulty urinating and dysuria  Musculoskeletal: Positive for arthralgias and myalgias  Skin: Negative for rash and wound  Neurological: Negative for dizziness and headaches  Hematological: Does not bruise/bleed easily  Psychiatric/Behavioral: Negative for behavioral problems and confusion  Objective:    /72   Pulse 74   Temp 98 °F (36 7 °C)   Ht 5' 6" (1 676 m)   Wt 97 1 kg (214 lb)   BMI 34 54 kg/m²      Physical Exam   Constitutional: He appears well-developed and well-nourished  No distress  HENT:   Head: Normocephalic and atraumatic  Right Ear: External ear normal    Left Ear: External ear normal    Mouth/Throat: No oropharyngeal exudate  Mild post pharyngeal erythema but no exudate   Eyes: Conjunctivae are normal  Right eye exhibits no discharge  Left eye exhibits no discharge  Neck: Neck supple  No tracheal deviation present  Cardiovascular: Normal rate and regular rhythm  No murmur heard  Pulmonary/Chest: Effort normal and breath sounds normal  No respiratory distress  He has no wheezes  He has no rales  Abdominal: There is tenderness  Musculoskeletal: He exhibits no deformity  Lymphadenopathy:     He has cervical adenopathy  Neurological: He is alert  He exhibits normal muscle tone  Skin: Skin is warm and dry  No rash noted  Psychiatric: He has a normal mood and affect  His behavior is normal    Nursing note and vitals reviewed

## 2018-12-23 ENCOUNTER — DOCUMENTATION (OUTPATIENT)
Dept: FAMILY MEDICINE CLINIC | Facility: HOSPITAL | Age: 58
End: 2018-12-23

## 2018-12-23 NOTE — PROGRESS NOTES
patient called  dx with acute bronchitis and given ceftin  currently experiencing weaker urine stream associated with a decrease with urine  still able to urinate  advise to hold ceftin tonight  will review with dr Tracie Harada tomorrow and make further recommendations

## 2018-12-24 DIAGNOSIS — J20.9 ACUTE BRONCHITIS, UNSPECIFIED ORGANISM: Primary | ICD-10-CM

## 2018-12-24 RX ORDER — DOXYCYCLINE 100 MG/1
100 TABLET ORAL 2 TIMES DAILY
Qty: 20 TABLET | Refills: 0 | Status: SHIPPED | OUTPATIENT
Start: 2018-12-24 | End: 2018-12-27

## 2018-12-27 ENCOUNTER — OFFICE VISIT (OUTPATIENT)
Dept: FAMILY MEDICINE CLINIC | Facility: HOSPITAL | Age: 58
End: 2018-12-27
Payer: COMMERCIAL

## 2018-12-27 VITALS
HEART RATE: 60 BPM | BODY MASS INDEX: 36.22 KG/M2 | WEIGHT: 217.4 LBS | DIASTOLIC BLOOD PRESSURE: 74 MMHG | TEMPERATURE: 97.5 F | SYSTOLIC BLOOD PRESSURE: 112 MMHG | HEIGHT: 65 IN

## 2018-12-27 DIAGNOSIS — J20.9 ACUTE BRONCHITIS, UNSPECIFIED ORGANISM: Primary | ICD-10-CM

## 2018-12-27 PROCEDURE — 1036F TOBACCO NON-USER: CPT | Performed by: FAMILY MEDICINE

## 2018-12-27 PROCEDURE — 99213 OFFICE O/P EST LOW 20 MIN: CPT | Performed by: FAMILY MEDICINE

## 2018-12-27 RX ORDER — DOXYCYCLINE 100 MG/1
100 TABLET ORAL 2 TIMES DAILY
Qty: 20 TABLET | Refills: 0 | Status: SHIPPED | OUTPATIENT
Start: 2018-12-27 | End: 2019-01-06

## 2018-12-27 NOTE — PROGRESS NOTES
Assessment/Plan:        Problem List Items Addressed This Visit     None      Visit Diagnoses     Acute bronchitis, unspecified organism    -  Primary    Relevant Medications    doxycycline (ADOXA) 100 MG tablet        Had 3 days of ceftin  This cause urinary difficulties  Improved with dc of ceftin  Although improved I think a full course of abx should be given  Doxy for 7 days sent  To call if any se/ar/problems  To call our office if any concerns/questions at [phone]       ______________________________________________________________      Chief Complaint   Patient presents with   Jensen Henderson last week, was put on Doxy, had difficulty urinating  Stopped med, urinary problems resolved, however, bronchitis not better  History of Present Illness:  Here for follow up of  Was seen last week for acute bronchitis  Due to concern for no improvement and bacterial etiology he was placed on ceftin  Took this for 3 days  He called the answering service and we spoke over the phone  I advised to stop the ceftin  He felt better in 24 hours  I sent a prescription for doxycyline but there was a miscommunication  He overall is feeling better  But ongoing chest congestion and coughing  No fever, no chills  Review of Systems   Constitutional: Negative  Negative for activity change, appetite change, chills and diaphoresis  HENT: Negative for congestion and dental problem  Respiratory: Negative  Negative for apnea, chest tightness, shortness of breath and wheezing  Cardiovascular: Negative  Negative for chest pain, palpitations and leg swelling  Gastrointestinal: Negative  Negative for abdominal distention, abdominal pain, constipation, diarrhea and nausea  Genitourinary: Negative  Negative for difficulty urinating, dysuria and frequency         Social History     Social History    Marital status: /Civil Union     Spouse name: N/A    Number of children: N/A  Years of education: N/A     Occupational History     - full-time      Social History Main Topics    Smoking status: Never Smoker    Smokeless tobacco: Never Used    Alcohol use Yes      Comment: social; occasional alcohol use    Drug use: No    Sexual activity: Not on file     Other Topics Concern    Not on file     Social History Narrative    No narrative on file               Allergies   Allergen Reactions    Ceftin [Cefuroxime] Other (See Comments)     Difficulty urinating            Vitals:    12/27/18 1017   BP: 112/74   Pulse: 60   Temp: 97 5 °F (36 4 °C)     Body mass index is 35 9 kg/m²  Physical Exam   Constitutional: He is oriented to person, place, and time  He appears well-developed and well-nourished  HENT:   Head: Normocephalic and atraumatic  Eyes: Pupils are equal, round, and reactive to light  EOM are normal    Neck: Normal range of motion  Neck supple  Cardiovascular: Normal rate, regular rhythm and normal heart sounds  Pulmonary/Chest: Effort normal and breath sounds normal  No respiratory distress  He has no wheezes  He has no rales  He exhibits no tenderness  Abdominal: Soft  Bowel sounds are normal    Neurological: He is alert and oriented to person, place, and time  Skin: Skin is warm and dry  Psychiatric: He has a normal mood and affect  His behavior is normal    Nursing note and vitals reviewed  No Follow-up on file

## 2019-06-27 ENCOUNTER — TELEPHONE (OUTPATIENT)
Dept: FAMILY MEDICINE CLINIC | Facility: HOSPITAL | Age: 59
End: 2019-06-27

## 2019-06-27 DIAGNOSIS — E78.5 DYSLIPIDEMIA: Primary | ICD-10-CM

## 2019-06-27 NOTE — TELEPHONE ENCOUNTER
Patient asking for labs to be done prior to physical in July    I see an active lipid, psa, glucose, a1c, and vit d    Anything else you want done?    pcb - he will be away and knows he does not need to  orders

## 2019-06-27 NOTE — TELEPHONE ENCOUNTER
Vit D/FBS/A1c active - I added FLP but TSH/PSA were done 8/18 and cannot be done until the end of Aug - has to be a year

## 2019-07-15 ENCOUNTER — APPOINTMENT (OUTPATIENT)
Dept: LAB | Facility: HOSPITAL | Age: 59
End: 2019-07-15
Payer: COMMERCIAL

## 2019-07-15 DIAGNOSIS — R73.9 HYPERGLYCEMIA: ICD-10-CM

## 2019-07-15 DIAGNOSIS — E55.9 VITAMIN D DEFICIENCY: ICD-10-CM

## 2019-07-15 LAB
25(OH)D3 SERPL-MCNC: 26.2 NG/ML (ref 30–100)
CHOLEST SERPL-MCNC: 96 MG/DL (ref 50–200)
EST. AVERAGE GLUCOSE BLD GHB EST-MCNC: 123 MG/DL
GLUCOSE P FAST SERPL-MCNC: 103 MG/DL (ref 65–99)
HBA1C MFR BLD: 5.9 % (ref 4.2–6.3)
HDLC SERPL-MCNC: 37 MG/DL (ref 40–60)
LDLC SERPL CALC-MCNC: 41 MG/DL (ref 0–100)
NONHDLC SERPL-MCNC: 59 MG/DL
TRIGL SERPL-MCNC: 90 MG/DL

## 2019-07-15 PROCEDURE — 80061 LIPID PANEL: CPT | Performed by: INTERNAL MEDICINE

## 2019-07-15 PROCEDURE — 36415 COLL VENOUS BLD VENIPUNCTURE: CPT

## 2019-07-15 PROCEDURE — 82306 VITAMIN D 25 HYDROXY: CPT

## 2019-07-15 PROCEDURE — 83036 HEMOGLOBIN GLYCOSYLATED A1C: CPT

## 2019-07-15 PROCEDURE — 82947 ASSAY GLUCOSE BLOOD QUANT: CPT

## 2019-07-16 ENCOUNTER — OFFICE VISIT (OUTPATIENT)
Dept: UROLOGY | Facility: MEDICAL CENTER | Age: 59
End: 2019-07-16
Payer: COMMERCIAL

## 2019-07-16 VITALS
DIASTOLIC BLOOD PRESSURE: 70 MMHG | WEIGHT: 221 LBS | BODY MASS INDEX: 36.82 KG/M2 | SYSTOLIC BLOOD PRESSURE: 140 MMHG | HEIGHT: 65 IN | HEART RATE: 68 BPM

## 2019-07-16 DIAGNOSIS — Z12.5 ENCOUNTER FOR PROSTATE CANCER SCREENING: ICD-10-CM

## 2019-07-16 DIAGNOSIS — N40.2 PROSTATE NODULE: Primary | ICD-10-CM

## 2019-07-16 LAB
SL AMB  POCT GLUCOSE, UA: NORMAL
SL AMB LEUKOCYTE ESTERASE,UA: NORMAL
SL AMB POCT BILIRUBIN,UA: NORMAL
SL AMB POCT BLOOD,UA: NORMAL
SL AMB POCT CLARITY,UA: CLEAR
SL AMB POCT COLOR,UA: YELLOW
SL AMB POCT KETONES,UA: NORMAL
SL AMB POCT NITRITE,UA: NORMAL
SL AMB POCT PH,UA: 6
SL AMB POCT SPECIFIC GRAVITY,UA: 1.02
SL AMB POCT URINE PROTEIN: NORMAL
SL AMB POCT UROBILINOGEN: 0.2

## 2019-07-16 PROCEDURE — 99214 OFFICE O/P EST MOD 30 MIN: CPT | Performed by: UROLOGY

## 2019-07-16 PROCEDURE — 81003 URINALYSIS AUTO W/O SCOPE: CPT | Performed by: UROLOGY

## 2019-07-16 NOTE — PROGRESS NOTES
[address] [facility]  [facility]  [address] [facility]  [place], [address] [facility]  [phone]  www  [facility]  org      NAME: Camille Choi  AGE: 60 y o  SEX: male  : 1960   MRN: [id number]    DATE: 2019  TIME: 12:02 PM    Assessment and Plan:    Encounter for prostate cancer screening:  PSA low and stable  He wishes to continue with prostate cancer screening here so we will see him back in a year with a PSA  History of nephrolithiasis, urinalysis is negative renal ultrasound was negative last year  Chief Complaint   No chief complaint on file  History of Present Illness   Left nephrolithiasis:  Renal ultrasound 1 year ago showed no stones, completely normal     BPH:  Has enlarged median lobe on cystoscopy but no symptoms previously  Urinalysis is normal     Encounter for prostate cancer screening:  PSA low and stable at 0 55  He has a history of prostate nodule in the past which has since disappeared from examination and he underwent biopsy a few years ago  The following portions of the patient's history were reviewed and updated as appropriate: allergies, current medications, past family history, past medical history, past social history, past surgical history and problem list   Past Medical History:   Diagnosis Date    Acute hepatitis A     Acute prostatitis     Dysphagia     last assessed - [id number]    History of radiation exposure to spine     histroy of exposure to radiation    Plantar fasciitis      Past Surgical History:   Procedure Laterality Date    COLONOSCOPY      Complete Colonoscopy - 10 yrs, onset 2009; 2014 - grade 4 int hemorrhoids, mild diverticulosis in mid sigmoid colon, onset [id number]    CORONARY ANGIOPLASTY WITH STENT PLACEMENT      DENTAL SURGERY      ESOPHAGOGASTRODUODENOSCOPY  2015    Diagnostic EGD 4/2/15 - gastritis and duodenitis;  Sm hiatal hernia, gastric polyp, onset 27 Jun 2012    PROSTATE BIOPSY  2015    needle biopsy     shoulder  Review of Systems   Review of Systems   Genitourinary: Negative  Musculoskeletal: Positive for back pain  Active Problem List     Patient Active Problem List   Diagnosis    Benign prostatic hyperplasia without lower urinary tract symptoms    CAD (coronary atherosclerotic disease)    Calcium kidney stones    Dyslipidemia    Edema    Erectile dysfunction    Facet hypertrophy of lumbosacral region    GERD (gastroesophageal reflux disease)    Hyperglycemia    Internal hemorrhoids    Microscopic hematuria    Obesity    Osteoarthritis cervical spine    Paroxysmal SVT (supraventricular tachycardia) (HCC)    Rhinitis    Sacral radiculopathy    Vitamin D deficiency    Psoriasis       Objective   There were no vitals taken for this visit  Physical Exam   Constitutional: He is oriented to person, place, and time  He appears well-developed and well-nourished  HENT:   Head: Normocephalic and atraumatic  Eyes: EOM are normal    Neck: Normal range of motion  Pulmonary/Chest: Effort normal    Genitourinary: Rectum normal and prostate normal    Genitourinary Comments: Rectal exam reveals normal tone, no masses and his prostate is about 30 g, smooth symmetric and benign  No nodules  Musculoskeletal: Normal range of motion  Neurological: He is alert and oriented to person, place, and time  Skin: Skin is warm and dry  Psychiatric: He has a normal mood and affect   His behavior is normal  Judgment and thought content normal            Current Medications     Current Outpatient Medications:     aspirin 81 MG tablet, Take 1 tablet by mouth daily, Disp: , Rfl:     atorvastatin (LIPITOR) 40 mg tablet, Take by mouth, Disp: , Rfl:     clopidogrel (PLAVIX) 75 mg tablet, Take 1 tablet by mouth daily, Disp: , Rfl:     ezetimibe (ZETIA) 10 mg tablet, Take 1 tablet by mouth daily, Disp: , Rfl:     fexofenadine (ALLEGRA) 180 MG tablet, Take 1 tablet by mouth daily as needed, Disp: , Rfl:     metoprolol succinate (TOPROL-XL) 50 mg 24 hr tablet, Take 75 mg by mouth daily, Disp: , Rfl: 0    omeprazole (PRILOSEC) 20 mg delayed release capsule, Take by mouth, Disp: , Rfl:         Silverio Lambert MD

## 2019-07-18 ENCOUNTER — OFFICE VISIT (OUTPATIENT)
Dept: FAMILY MEDICINE CLINIC | Facility: HOSPITAL | Age: 59
End: 2019-07-18
Payer: COMMERCIAL

## 2019-07-18 VITALS
BODY MASS INDEX: 36.49 KG/M2 | HEART RATE: 65 BPM | WEIGHT: 219 LBS | SYSTOLIC BLOOD PRESSURE: 132 MMHG | TEMPERATURE: 97.8 F | DIASTOLIC BLOOD PRESSURE: 78 MMHG | HEIGHT: 65 IN

## 2019-07-18 DIAGNOSIS — R60.9 EDEMA, UNSPECIFIED TYPE: ICD-10-CM

## 2019-07-18 DIAGNOSIS — E78.5 DYSLIPIDEMIA: ICD-10-CM

## 2019-07-18 DIAGNOSIS — K21.9 GASTROESOPHAGEAL REFLUX DISEASE WITHOUT ESOPHAGITIS: ICD-10-CM

## 2019-07-18 DIAGNOSIS — E66.01 SEVERE OBESITY (BMI 35.0-39.9) WITH COMORBIDITY (HCC): ICD-10-CM

## 2019-07-18 DIAGNOSIS — R40.0 DAYTIME SOMNOLENCE: ICD-10-CM

## 2019-07-18 DIAGNOSIS — E55.9 VITAMIN D DEFICIENCY: ICD-10-CM

## 2019-07-18 DIAGNOSIS — R73.9 HYPERGLYCEMIA: Primary | ICD-10-CM

## 2019-07-18 DIAGNOSIS — R06.83 SNORING: ICD-10-CM

## 2019-07-18 DIAGNOSIS — N40.0 BENIGN PROSTATIC HYPERPLASIA WITHOUT LOWER URINARY TRACT SYMPTOMS: ICD-10-CM

## 2019-07-18 PROBLEM — K63.89 BACTERIAL OVERGROWTH SYNDROME: Status: RESOLVED | Noted: 2019-07-18 | Resolved: 2019-07-18

## 2019-07-18 PROBLEM — K22.70 BARRETT'S ESOPHAGUS: Status: RESOLVED | Noted: 2019-07-18 | Resolved: 2019-07-18

## 2019-07-18 PROBLEM — K22.70 BARRETT'S ESOPHAGUS: Status: ACTIVE | Noted: 2019-07-18

## 2019-07-18 PROBLEM — K63.89 BACTERIAL OVERGROWTH SYNDROME: Status: ACTIVE | Noted: 2019-07-18

## 2019-07-18 PROBLEM — K64.9 BLEEDING HEMORRHOID: Status: ACTIVE | Noted: 2019-07-18

## 2019-07-18 PROBLEM — K31.9 FUNCTIONAL DISORDER OF STOMACH: Status: ACTIVE | Noted: 2019-07-18

## 2019-07-18 PROCEDURE — 99214 OFFICE O/P EST MOD 30 MIN: CPT | Performed by: INTERNAL MEDICINE

## 2019-07-18 RX ORDER — ERGOCALCIFEROL (VITAMIN D2) 50 MCG
2000 CAPSULE ORAL DAILY
COMMUNITY

## 2019-07-18 NOTE — ASSESSMENT & PLAN NOTE
Vit D low again - pt not taking supplement x 1 mo or so - restart Vit D 2000 IU 1 tab PO q day - recheck in 6 mos - will order at next visit

## 2019-07-18 NOTE — PROGRESS NOTES
Assessment/Plan:    Hyperglycemia  Labs again c/w IFG -  Urged low sugar/carb diet and increase activity and get wgt down - recheck in  6 mo - will order at next appt    Dyslipidemia  FLP at goal except low HDL - con't current statin/Zetia - increase exercise, recheck FLP annually, diet/exercise/wgt loss encouraged    Vitamin D deficiency  Vit D low again - pt not taking supplement x 1 mo or so - restart Vit D 2000 IU 1 tab PO q day - recheck in 6 mos - will order at next visit    Benign prostatic hyperplasia without lower urinary tract symptoms  Just saw Uro - no changes to meds and pt notes no new symptoms - con't annual PSA and f/u as per Uro, call with new/worse symptoms    GERD (gastroesophageal reflux disease)  Symptoms currently controlled with PPI daily, pt states the most recent EGD did not show King's    Edema  No current symptoms of edema or CV dz, BMP added as was not done with recent labs - will call with results       Diagnoses and all orders for this visit:    Hyperglycemia    Dyslipidemia    Vitamin D deficiency    Benign prostatic hyperplasia without lower urinary tract symptoms    Gastroesophageal reflux disease without esophagitis    Edema, unspecified type  -     Basic metabolic panel; Future    Snoring and Daytime somnolence  Comments: Will check sleep study - SE of untreated CLIFF were reviewed - will call with results after sleep study down, wgt loss encouraged  Orders:  -     Home Study; Future    Other orders  -     Vitamin D, Ergocalciferol, 2000 units CAPS; Take 2,000 Units by mouth daily      Oral 7/14 - 10 yrs    EGD 12/17 - neg for King's as per pt    Subjective:      Patient ID: Radha Marie is a 60 y o  male  HPI Pt here for follow up appt and BW results    BW results were d/w pt in detail: FBS/A1C 103/5 9, FLP with HDL low at 37 but TC/TG/LDL at goal, Vit D low at 26 2    Def of nml vs IFG vs DM was d/w pt in detail  Diet/exercise was reviewed - wgt up 5 lbs from Dec 2018  BMI was reviewed  He admits he does walk at home but [place] walking  He admits his diet could be better  Goal FLP was d/w pt in detail  Diet/exercise reviewed as noted above  He is taking his Atorvastatin daily as directed w/o Se  He notes no stroke/TIA symptoms/CP  Goal Vit d level was d/w pt in detail  He is currently taking no OTC Vit D   He stopped it about a month ago as he is in the sun more in the summer  He has no SE with the Vit D supplement  Pt saw Uro for prostate CA screening f/u and h/o nephrolithiasis earlier this week  He had PSA ordered for next year and was told to f/u annually  Pt states he just saw Cardio - new Cardio doc - now seeing Dr Bertrand Meigs  He notes no recent edema/CP/palp/orthopnea  He had an Echo ordered to be done in Dec before next f/u with Cardio  Pt has appt with GI next week  He notes no current GERD symptoms  He is on Omeprazole 20 mg daily  Pt notes daytime somnolence  He does snore at night  He has had no witnessed apnea  He has no frequent HA's or HTN  He feels tired and un-refreshed upon awakening  When he has bad nights he has issues with concentration and memory the next day  Rule 7/14 - 10 yrs    EGD 12/17 - neg for King's    Review of Systems   Constitutional: Positive for fatigue  Negative for chills and fever  HENT: Negative for congestion and sinus pain  Eyes: Negative for pain and visual disturbance  Respiratory: Negative for cough, chest tightness and shortness of breath  Cardiovascular: Negative for chest pain, palpitations and leg swelling  Gastrointestinal: Negative for abdominal pain, blood in stool, constipation, diarrhea, nausea and vomiting  Endocrine: Negative for polydipsia and polyuria  Genitourinary: Negative for difficulty urinating and dysuria  Musculoskeletal: Negative for arthralgias and myalgias  Skin: Negative for rash and wound     Neurological: Negative for dizziness and headaches  Hematological: Does not bruise/bleed easily  Psychiatric/Behavioral: Positive for decreased concentration and sleep disturbance  Negative for behavioral problems and confusion  Objective:    /78 (BP Location: Right arm, Patient Position: Sitting, Cuff Size: Standard)   Pulse 65   Temp 97 8 °F (36 6 °C)   Ht 5' 5 25" (1 657 m)   Wt 99 3 kg (219 lb)   BMI 36 16 kg/m²      Physical Exam   Constitutional: He appears well-developed and well-nourished  No distress  HENT:   Head: Normocephalic and atraumatic  Mouth/Throat: No oropharyngeal exudate  Eyes: Conjunctivae are normal  Right eye exhibits no discharge  Left eye exhibits no discharge  Neck: Neck supple  No tracheal deviation present  Cardiovascular: Normal rate, regular rhythm and normal heart sounds  No murmur heard  Pulmonary/Chest: Effort normal and breath sounds normal  No respiratory distress  He has no wheezes  He has no rales  Abdominal: Soft  He exhibits no distension  There is no tenderness  There is no rebound and no guarding  Musculoskeletal: He exhibits no deformity  Lymphadenopathy:     He has no cervical adenopathy  Neurological: He is alert  He exhibits normal muscle tone  Skin: Skin is warm and dry  No rash noted  Psychiatric: He has a normal mood and affect  His behavior is normal    Nursing note and vitals reviewed  BMI Counseling: Body mass index is 36 16 kg/m²  Discussed the patient's BMI with him  The BMI is above average  BMI counseling and education was provided to the patient  Nutrition recommendations include reducing portion sizes, 3-5 servings of fruits/vegetables daily, consuming healthier snacks, moderation in carbohydrate intake, increasing intake of lean protein and reducing intake of saturated fat and trans fat  Exercise recommendations include moderate aerobic physical activity for 150 minutes/week and exercising 3-5 times per week

## 2019-07-18 NOTE — ASSESSMENT & PLAN NOTE
No current symptoms of edema or CV dz, BMP added as was not done with recent labs - will call with results

## 2019-07-18 NOTE — ASSESSMENT & PLAN NOTE
Labs again c/w IFG -  Urged low sugar/carb diet and increase activity and get wgt down - recheck in  6 mo - will order at next appt

## 2019-07-18 NOTE — PATIENT INSTRUCTIONS
Obesity   AMBULATORY CARE:   Obesity  is when your body mass index (BMI) is greater than 30  Your healthcare provider will use your height and weight to measure your BMI  The risks of obesity include  many health problems, such as injuries or physical disability  You may need tests to check for the following:  · Diabetes     · High blood pressure or high cholesterol     · Heart disease     · Gallbladder or liver disease     · Cancer of the colon, breast, prostate, liver, or kidney     · Sleep apnea     · Arthritis or gout  Seek care immediately if:   · You have a severe headache, confusion, or difficulty speaking  · You have weakness on one side of your body  · You have chest pain, sweating, or shortness of breath  Contact your healthcare provider if:   · You have symptoms of gallbladder or liver disease, such as pain in your upper abdomen  · You have knee or hip pain and discomfort while walking  · You have symptoms of diabetes, such as intense hunger and thirst, and frequent urination  · You have symptoms of sleep apnea, such as snoring or daytime sleepiness  · You have questions or concerns about your condition or care  Treatment for obesity  focuses on helping you lose weight to improve your health  Even a small decrease in BMI can reduce the risk for many health problems  Your healthcare provider will help you set a weight-loss goal   · Lifestyle changes  are the first step in treating obesity  These include making healthy food choices and getting regular physical activity  Your healthcare provider may suggest a weight-loss program that involves coaching, education, and therapy  · Medicine  may help you lose weight when it is used with a healthy diet and physical activity  · Surgery  can help you lose weight if you are very obese and have other health problems  There are several types of weight-loss surgery  Ask your healthcare provider for more information    Be successful losing weight:   · Set small, realistic goals  An example of a small goal is to walk for 20 minutes 5 days a week  Anther goal is to lose 5% of your body weight  · Tell friends, family members, and coworkers about your goals  and ask for their support  Ask a friend to lose weight with you, or join a weight-loss support group  · Identify foods or triggers that may cause you to overeat , and find ways to avoid them  Remove tempting high-calorie foods from your home and workplace  Place a bowl of fresh fruit on your kitchen counter  If stress causes you to eat, then find other ways to cope with stress  · Keep a diary to track what you eat and drink  Also write down how many minutes of physical activity you do each day  Weigh yourself once a week and record it in your diary  Eating changes: You will need to eat 500 to 1,000 fewer calories each day than you currently eat to lose 1 to 2 pounds a week  The following changes will help you cut calories:  · Eat smaller portions  Use small plates, no larger than 9 inches in diameter  Fill your plate half full of fruits and vegetables  Measure your food using measuring cups until you know what a serving size looks like  · Eat 3 meals and 1 or 2 snacks each day  Plan your meals in advance  [employer] and eat at home most of the time  Eat slowly  · Eat fruits and vegetables at every meal   They are low in calories and high in fiber, which makes you feel full  Do not add butter, margarine, or cream sauce to vegetables  Use herbs to season steamed vegetables  · Eat less fat and fewer fried foods  Eat more baked or grilled chicken and fish  These protein sources are lower in calories and fat than red meat  Limit fast food  Dress your salads with olive oil and vinegar instead of bottled dressing  · Limit the amount of sugar you eat  Do not drink sugary beverages  Limit alcohol  Activity changes:  Physical activity is good for your body in many ways   It helps you burn calories and build strong muscles  It decreases stress and depression, and improves your mood  It can also help you sleep better  Talk to your healthcare provider before you begin an exercise program   · Exercise for at least 30 minutes 5 days a week  Start slowly  Set aside time each day for physical activity that you enjoy and that is convenient for you  It is best to do both weight training and an activity that increases your heart rate, such as walking, bicycling, or swimming  · Find ways to be more active  Do yard work and housecleaning  Walk up the stairs instead of using elevators  Spend your leisure time going to events that require walking, such as outdoor festivals or fairs  This extra physical activity can help you lose weight and keep it off  Follow up with your healthcare provider as directed: You may need to meet with a dietitian  Write down your questions so you remember to ask them during your visits  © 2017 [employer] Information is for End User's use only and may not be sold, redistributed or otherwise used for commercial purposes  All illustrations and images included in CareNotes® are the copyrighted property of [employer] , [employer]  or Gildardo Gandhi  The above information is an  only  It is not intended as medical advice for individual conditions or treatments  Talk to your doctor, nurse or pharmacist before following any medical regimen to see if it is safe and effective for you  Heart Healthy Diet   AMBULATORY CARE:   A heart healthy diet  is an eating plan low in total fat, unhealthy fats, and sodium (salt)  A heart healthy diet helps decrease your risk for heart disease and stroke  Limit the amount of fat you eat to 25% to 35% of your total daily calories  Limit sodium to less than 2,300 mg each day  Healthy fats:  Healthy fats can help improve cholesterol levels   The risk for heart disease is decreased when cholesterol levels are normal  Choose healthy fats, such as the following:  · Unsaturated fat  is found in foods such as soybean, canola, olive, corn, and safflower oils  It is also found in soft tub margarine that is made with liquid vegetable oil  · Omega-3 fat  is found in certain fish, such as salmon, tuna, and trout, and in walnuts and flaxseed  Unhealthy fats:  Unhealthy fats can cause unhealthy cholesterol levels in your blood and increase your risk of heart disease  Limit unhealthy fats, such as the following:  · Cholesterol  is found in animal foods, such as eggs and lobster, and in dairy products made from whole milk  Limit cholesterol to less than 300 milligrams (mg) each day  You may need to limit cholesterol to 200 mg each day if you have heart disease  · Saturated fat  is found in meats, such as mills and hamburger  It is also found in chicken or turkey skin, whole milk, and butter  Limit saturated fat to less than 7% of your total daily calories  Limit saturated fat to less than 6% if you have heart disease or are at increased risk for it  · Trans fat  is found in packaged foods, such as potato chips and cookies  It is also in hard margarine, some fried foods, and shortening  Avoid trans fats as much as possible    Heart healthy foods and drinks to include:  Ask your dietitian or healthcare provider how many servings to have from each of the following food groups:  · Grains:      ¨ Whole-wheat breads, cereals, and pastas, and brown rice    ¨ Low-fat, low-sodium crackers and chips    · Vegetables:      ¨ Broccoli, green beans, green peas, and spinach    ¨ Collards, kale, and lima beans    ¨ Carrots, sweet potatoes, tomatoes, and peppers    ¨ Canned vegetables with no salt added    · Fruits:      ¨ Bananas, peaches, pears, and pineapple    ¨ Grapes, raisins, and dates    ¨ Oranges, tangerines, grapefruit, orange juice, and grapefruit juice    ¨ Apricots, mangoes, melons, and papaya    ¨ Raspberries and strawberries    ¨ Canned fruit with no added sugar    · Low-fat dairy products:      ¨ Nonfat (skim) milk, 1% milk, and low-fat almond, cashew, or soy milks fortified with calcium    ¨ Low-fat cheese, regular or frozen yogurt, and cottage cheese    · Meats and proteins , such as lean cuts of beef and pork (loin, leg, round), skinless chicken and turkey, legumes, soy products, egg whites, and nuts  Foods and drinks to limit or avoid:  Ask your dietitian or healthcare provider about these and other foods that are high in unhealthy fat, sodium, and sugar:  · Snack or packaged foods , such as frozen dinners, cookies, macaroni and cheese, and cereals with more than 300 mg of sodium per serving    · Canned or dry mixes  for cakes, soups, sauces, or gravies    · Vegetables with added sodium , such as instant potatoes, vegetables with added sauces, or regular canned vegetables    · Other foods high in sodium , such as ketchup, barbecue sauce, salad dressing, pickles, olives, soy sauce, and miso    · High-fat dairy foods  such as whole or 2% milk, cream cheese, or sour cream, and cheeses     · High-fat protein foods  such as high-fat cuts of beef (T-bone steaks, ribs), chicken or turkey with skin, and organ meats, such as liver    · Cured or smoked meats , such as hot dogs, mills, and sausage    · Unhealthy fats and oils , such as butter, stick margarine, shortening, and cooking oils such as coconut or palm oil    · Food and drinks high in sugar , such as soft drinks (soda), sports drinks, sweetened tea, candy, cake, cookies, pies, and doughnuts  Other diet guidelines to follow:   · Eat more foods containing omega-3 fats  Eat fish high in omega-3 fats at least 2 times a week  · Limit alcohol  Too much alcohol can damage your heart and raise your blood pressure  Women should limit alcohol to 1 drink a day  Men should limit alcohol to 2 drinks a day   A drink of alcohol is 12 ounces of beer, 5 ounces of wine, or 1½ ounces of liquor  · Choose low-sodium foods  High-sodium foods can lead to high blood pressure  Add little or no salt to food you prepare  Use herbs and spices in place of salt  · Eat more fiber  to help lower cholesterol levels  Eat at least 5 servings of fruits and vegetables each day  Eat 3 ounces of whole-grain foods each day  Legumes (beans) are also a good source of fiber  Lifestyle guidelines:   · Do not smoke  Nicotine and other chemicals in cigarettes and cigars can cause lung and heart damage  Ask your healthcare provider for information if you currently smoke and need help to quit  E-cigarettes or smokeless tobacco still contain nicotine  Talk to your healthcare provider before you use these products  · Exercise regularly  to help you maintain a healthy weight and improve your blood pressure and cholesterol levels  Ask your healthcare provider about the best exercise plan for you  Do not start an exercise program without asking your healthcare provider  Follow up with your healthcare provider as directed:  Write down your questions so you remember to ask them during your visits  © 2017 [facility] [facility] Information is for End User's use only and may not be sold, redistributed or otherwise used for commercial purposes  All illustrations and images included in CareNotes® are the copyrighted property of A D A M [employer] Inc  or Gildardo Gandhi  The above information is an  only  It is not intended as medical advice for individual conditions or treatments  Talk to your doctor, nurse or pharmacist before following any medical regimen to see if it is safe and effective for you

## 2019-07-18 NOTE — ASSESSMENT & PLAN NOTE
FLP at goal except low HDL - con't current statin/Zetia - increase exercise, recheck FLP annually, diet/exercise/wgt loss encouraged

## 2019-07-22 ENCOUNTER — OFFICE VISIT (OUTPATIENT)
Dept: GASTROENTEROLOGY | Facility: CLINIC | Age: 59
End: 2019-07-22
Payer: COMMERCIAL

## 2019-07-22 ENCOUNTER — TELEPHONE (OUTPATIENT)
Dept: FAMILY MEDICINE CLINIC | Facility: HOSPITAL | Age: 59
End: 2019-07-22

## 2019-07-22 VITALS
WEIGHT: 219 LBS | HEART RATE: 64 BPM | HEIGHT: 65 IN | BODY MASS INDEX: 36.49 KG/M2 | DIASTOLIC BLOOD PRESSURE: 64 MMHG | SYSTOLIC BLOOD PRESSURE: 110 MMHG

## 2019-07-22 DIAGNOSIS — K22.70 BARRETT'S ESOPHAGUS WITHOUT DYSPLASIA: ICD-10-CM

## 2019-07-22 DIAGNOSIS — I25.10 ATHEROSCLEROSIS OF NATIVE CORONARY ARTERY OF NATIVE HEART WITHOUT ANGINA PECTORIS: ICD-10-CM

## 2019-07-22 DIAGNOSIS — E55.9 VITAMIN D DEFICIENCY: ICD-10-CM

## 2019-07-22 DIAGNOSIS — Z12.11 COLON CANCER SCREENING: ICD-10-CM

## 2019-07-22 DIAGNOSIS — K21.00 GASTROESOPHAGEAL REFLUX DISEASE WITH ESOPHAGITIS: Primary | ICD-10-CM

## 2019-07-22 PROCEDURE — 99214 OFFICE O/P EST MOD 30 MIN: CPT | Performed by: INTERNAL MEDICINE

## 2019-07-22 RX ORDER — RANITIDINE 150 MG/1
300 CAPSULE ORAL 2 TIMES DAILY
Qty: 180 CAPSULE | Refills: 3 | Status: SHIPPED | OUTPATIENT
Start: 2019-07-22 | End: 2019-12-20

## 2019-07-22 NOTE — PROGRESS NOTES
[id number] - Outpatient Follow-up Note  Wally Leyva 60 y o  male MRN: [id number]  Encounter: [id number]    ASSESSMENT AND PLAN:      1  Gastroesophageal reflux disease with esophagitis  Patient with gastroesophageal reflux symptoms under good control  He takes omeprazole 20 mg daily  He is very concerned about the vitamin-D deficiency and would like to try to wean off of this if this is advisable  There would be any contraindication  I would recommend the following  -try omeprazole 20 mg a day for 5 days a week and on 2 days a week Tuesdays Fridays take ranitidine 300 mg twice a day--subsequently the patient can try the ranitidine 300 mg twice a day for 3 days a week and 4 days a week except trip and titrate to tolerance  If he does very well perhaps he could switch anti early over to 300 mg twice a day  If this is not successful really no harm in remaining on omeprazole  Perhaps he could end up taking it every other day alternating with ranitidine    -continue anti reflux diet  -gradual weight loss  - ranitidine (ZANTAC) 150 MG capsule; Take 2 capsules (300 mg total) by mouth 2 (two) times a day  Dispense: 180 capsule; Refill: 3    2  Vitamin D deficiency  Under care with Dr Edward Hong high-dose vitamin-D  Getting out in the sun line    3  Atherosclerosis of native coronary artery of native heart without angina pectoris  Stable no angina    4  Colon cancer screening  Up today  Patient had negative colonoscopy 2014 next examination would be 2020    5  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    This is a questionable diagnosis  One biopsy in 2015 revealed intestinal metaplasia  If this was taken below the EG junction would not be truly King's  Biopsies in 2017 did not reveal King's  Just of function of with a biopsy was taken    Given medication adjustment plans would recommend follow-up EGD in 2020 December    Followup Appointment:  1 year  ______________________________________________________________________    Chief Complaint   Patient presents with    Follow-up     GERD     HPI:  Patient returns to the office for follow-up visit with respect to gastroesophageal reflux  He has been advised by his personal physician that his vitamin-D levels are low  He is concerned that omeprazole may be interfering with its absorption and causing some of the problem  His heartburn is under good control on omeprazole 20 mg daily  He want to see if it was advisable to change over to ranitidine in lieu of omeprazole  He denies any dysphagia or odynophagia  One biopsy a couple of years ago 2015 suggested the diagnosis of King's esophagus  A subsequent EGD in 2017 did not have this finding  He avoids caffeine and this really does seem to make a difference  I would like to try to lose a little bit more weight  The patient works at a [employer] in [place] and is there most of the year but comes back to the summer and Christmas  Historical Information   Past Medical History:   Diagnosis Date    Acute hepatitis A     Acute prostatitis     Coronary artery disease     Dysphagia     last assessed - 24Jun2013    GERD (gastroesophageal reflux disease)     History of radiation exposure to spine     histroy of exposure to radiation    Plantar fasciitis      Past Surgical History:   Procedure Laterality Date    COLONOSCOPY      Complete Colonoscopy - 10 yrs, onset 7/31/2009; 7/9/2014 - grade 4 int hemorrhoids, mild diverticulosis in mid sigmoid colon, onset [id number]    CORONARY ANGIOPLASTY WITH STENT PLACEMENT      DENTAL SURGERY      ESOPHAGOGASTRODUODENOSCOPY  04/02/2015    Diagnostic EGD 4/2/15 - gastritis and duodenitis;  Sm hiatal hernia, gastric polyp, onset 27 Jun 2012    PROSTATE BIOPSY  2015    needle biopsy     Social History     Substance and Sexual Activity   Alcohol Use Not Currently    Comment: social; occasional alcohol use     Social History     Substance and Sexual Activity   Drug Use No     Social History     Tobacco Use   Smoking Status Never Smoker   Smokeless Tobacco Never Used     Family History   Problem Relation Age of Onset    Hypertension Mother     Breast cancer Mother     Hyperlipidemia Mother     Osteoporosis Mother     Hypertension Father     Coronary artery disease Paternal Grandfather     Colon cancer Neg Hx     Colon polyps Neg Hx          Current Outpatient Medications:     aspirin 81 MG tablet    atorvastatin (LIPITOR) 40 mg tablet    clopidogrel (PLAVIX) 75 mg tablet    ezetimibe (ZETIA) 10 mg tablet    fexofenadine (ALLEGRA) 180 MG tablet    metoprolol succinate (TOPROL-XL) 50 mg 24 hr tablet    omeprazole (PRILOSEC) 20 mg delayed release capsule    Vitamin D, Ergocalciferol, 2000 units CAPS    ranitidine (ZANTAC) 150 MG capsule  Allergies   Allergen Reactions    Ceftin [Cefuroxime] Other (See Comments)     Difficulty urinating        10 Point REVIEW OF SYSTEMS   General positive for some weight gain otherwise negative fatigue fever night sweats ENT negative respiratory negative cardiovascular negative GI negative hematology positive for easy bruising  Musculoskeletal negative for painful joints swollen joints leg cramps skin negative neurologic negative psychiatric negative  PHYSICAL EXAM:    Blood pressure 110/64, pulse 64, height 5' 5 25" (1 657 m), weight 99 3 kg (219 lb)  Body mass index is 36 16 kg/m²  General Appearance:  Alert, cooperative, no distress  HEENT:  Normocephalic, atraumatic, anicteric  Neck: Supple, symmetrical, trachea midline  Lungs: Clear to auscultation bilaterally; no rales, rhonchi or wheezing; respirations unlabored   Heart: Regular rate and rhythm; no murmur, rub, or gallop    Abdomen:   Soft, non-tender, non-distended; normal bowel sounds; no masses, no organomegaly   Rectal:  Deferred   Extremities:  No cyanosis, clubbing or edema   Skin:  No jaundice, rashes, or lesions Lymph nodes: No palpable cervical lymphadenopathy     Lab Results:   Lab Results   Component Value Date    WBC 6 94 08/04/2018    HGB 16 3 08/04/2018    HCT 46 3 08/04/2018    MCV 87 08/04/2018     08/04/2018     Lab Results   Component Value Date     07/14/2015    K 4 2 08/04/2018     08/04/2018    CO2 28 08/04/2018    ANIONGAP 8 07/14/2015    BUN 11 08/04/2018    CREATININE 1 01 08/04/2018    GLUCOSE 97 07/14/2015    GLUF 103 (H) 07/15/2019    CALCIUM 8 8 08/04/2018    AST 23 08/04/2018    ALT 50 08/04/2018    ALKPHOS 87 08/04/2018    PROT 7 2 07/14/2015    BILITOT 0 9 07/14/2015    EGFR 82 08/04/2018

## 2019-07-22 NOTE — TELEPHONE ENCOUNTER
PATIENT WENT TO [facility] THIS MORNING - LIPID AND BMP ORDERED - LASHELL FROM LAB ASKING IF LIPID IS SUPPOSED TO BE DONE SINCE IT WAS JUST DRAWN/RESULTED 07/15/2019 - PLEASE CALL LASHELL BACK AT [address]

## 2019-07-22 NOTE — TELEPHONE ENCOUNTER
Both labs were not to be done for 6 mos - that was relayed to pt at his appt - told to do it when he returned for [facility]

## 2019-07-22 NOTE — TELEPHONE ENCOUNTER
I believe according to your note this wasn't supposed to be done until next year?    Just double checking, I will call Dusty Frey and let her know

## 2019-07-22 NOTE — LETTER
July 22, 2019     Jorje Ruiz, [address] [facility] [address]  [address] [facility]  [address] [address]    Patient: Wally Leyva   YOB: 1960   Date of Visit: 7/22/2019       Dear Dr Regina Soto: Thank you for referring Jake Leach to me for evaluation  Below are my notes for this consultation  If you have questions, please do not hesitate to call me  I look forward to following your patient along with you  Sincerely,        Cornelius Betts MD        CC: No Recipients  Cornelius Betts MD  7/22/2019  4:00 PM  Incomplete  [facility] - Outpatient Follow-up Note  Wally Leyva 60 y o  male MRN: [id number]  Encounter: [id number]    ASSESSMENT AND PLAN:      1  Gastroesophageal reflux disease with esophagitis  Patient with gastroesophageal reflux symptoms under good control  He takes omeprazole 20 mg daily  He is very concerned about the vitamin-D deficiency and would like to try to wean off of this if this is advisable  There would be any contraindication  I would recommend the following  -try omeprazole 20 mg a day for 5 days a week and on 2 days a week Tuesdays Fridays take ranitidine 300 mg twice a day--subsequently the patient can try the ranitidine 300 mg twice a day for 3 days a week and 4 days a week except trip and titrate to tolerance  If he does very well perhaps he could switch anti early over to 300 mg twice a day  If this is not successful really no harm in remaining on omeprazole  Perhaps he could end up taking it every other day alternating with ranitidine    -continue anti reflux diet  -gradual weight loss  - ranitidine (ZANTAC) 150 MG capsule; Take 2 capsules (300 mg total) by mouth 2 (two) times a day  Dispense: 180 capsule; Refill: 3    2  Vitamin D deficiency  Under care with Dr Edward Hong high-dose vitamin-D  Getting out in the sun line    3   Atherosclerosis of native coronary artery of native heart without angina pectoris  Stable no angina    4  Colon cancer screening  Up today  Patient had negative colonoscopy 2014 next examination would be 2020 5  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    This is a questionable diagnosis  One biopsy in 2015 revealed intestinal metaplasia  If this was taken below the EG junction would not be truly King's  Biopsies in 2017 did not reveal King's  Just of function of with a biopsy was taken  Given medication adjustment plans would recommend follow-up EGD in 2020 December    Followup Appointment:  1 year  ______________________________________________________________________    Chief Complaint   Patient presents with    Follow-up     GERD     HPI:  Patient returns to the office for follow-up visit with respect to gastroesophageal reflux  He has been advised by his personal physician that his vitamin-D levels are low  He is concerned that omeprazole may be interfering with its absorption and causing some of the problem  His heartburn is under good control on omeprazole 20 mg daily  He want to see if it was advisable to change over to ranitidine in lieu of omeprazole  He denies any dysphagia or odynophagia  One biopsy a couple of years ago 2015 suggested the diagnosis of King's esophagus  A subsequent EGD in 2017 did not have this finding  He avoids caffeine and this really does seem to make a difference  I would like to try to lose a little bit more weight  The patient works at a [employer] in [place] and is there most of the year but comes back to the summer and [place]    Historical Information   Past Medical History:   Diagnosis Date    Acute hepatitis A     Acute prostatitis     Coronary artery disease     Dysphagia     last assessed - 24Jun2013    GERD (gastroesophageal reflux disease)     History of radiation exposure to spine     histroy of exposure to radiation    Plantar fasciitis      Past Surgical History:   Procedure Laterality Date    COLONOSCOPY      Complete Colonoscopy - 10 yrs, onset 7/31/2009; 7/9/2014 - grade 4 int hemorrhoids, mild diverticulosis in mid sigmoid colon, onset [id number]    CORONARY ANGIOPLASTY WITH STENT PLACEMENT      DENTAL SURGERY      ESOPHAGOGASTRODUODENOSCOPY  04/02/2015    Diagnostic EGD 4/2/15 - gastritis and duodenitis; Sm hiatal hernia, gastric polyp, onset 27 Jun 2012    PROSTATE BIOPSY  2015    needle biopsy     Social History     Substance and Sexual Activity   Alcohol Use Not Currently    Comment: social; occasional alcohol use     Social History     Substance and Sexual Activity   Drug Use No     Social History     Tobacco Use   Smoking Status Never Smoker   Smokeless Tobacco Never Used     Family History   Problem Relation Age of Onset    Hypertension Mother     Breast cancer Mother     Hyperlipidemia Mother     Osteoporosis Mother     Hypertension Father     Coronary artery disease Paternal Grandfather     Colon cancer Neg Hx     Colon polyps Neg Hx          Current Outpatient Medications:     aspirin 81 MG tablet    atorvastatin (LIPITOR) 40 mg tablet    clopidogrel (PLAVIX) 75 mg tablet    ezetimibe (ZETIA) 10 mg tablet    fexofenadine (ALLEGRA) 180 MG tablet    metoprolol succinate (TOPROL-XL) 50 mg 24 hr tablet    omeprazole (PRILOSEC) 20 mg delayed release capsule    Vitamin D, Ergocalciferol, 2000 units CAPS    ranitidine (ZANTAC) 150 MG capsule  Allergies   Allergen Reactions    Ceftin [Cefuroxime] Other (See Comments)     Difficulty urinating        10 Point REVIEW OF SYSTEMS   General positive for some weight gain otherwise negative fatigue fever night sweats ENT negative respiratory negative cardiovascular negative GI negative hematology positive for easy bruising  Musculoskeletal negative for painful joints swollen joints leg cramps skin negative neurologic negative psychiatric negative  PHYSICAL EXAM:    Blood pressure 110/64, pulse 64, height 5' 5 25" (1 657 m), weight 99 3 kg (219 lb)   Body mass index is 36 16 kg/m²  General Appearance:  Alert, cooperative, no distress  HEENT:  Normocephalic, atraumatic, anicteric  Neck: Supple, symmetrical, trachea midline  Lungs: Clear to auscultation bilaterally; no rales, rhonchi or wheezing; respirations unlabored   Heart: Regular rate and rhythm; no murmur, rub, or gallop  Abdomen:   Soft, non-tender, non-distended; normal bowel sounds; no masses, no organomegaly   Rectal:  Deferred   Extremities:  No cyanosis, clubbing or edema   Skin:  No jaundice, rashes, or lesions   Lymph nodes: No palpable cervical lymphadenopathy     Lab Results:   Lab Results   Component Value Date    WBC 6 94 08/04/2018    HGB 16 3 08/04/2018    HCT 46 3 08/04/2018    MCV 87 08/04/2018     08/04/2018     Lab Results   Component Value Date     07/14/2015    K 4 2 08/04/2018     08/04/2018    CO2 28 08/04/2018    ANIONGAP 8 07/14/2015    BUN 11 08/04/2018    CREATININE 1 01 08/04/2018    GLUCOSE 97 07/14/2015    GLUF 103 (H) 07/15/2019    CALCIUM 8 8 08/04/2018    AST 23 08/04/2018    ALT 50 08/04/2018    ALKPHOS 87 08/04/2018    PROT 7 2 07/14/2015    BILITOT 0 9 07/14/2015    EGFR 82 08/04/2018

## 2019-07-22 NOTE — PATIENT INSTRUCTIONS
[facility] - Outpatient Follow-up Note  Mortimer Ferry 60 y o  male MRN: [id number]  Encounter: [id number]    ASSESSMENT AND PLAN:      1  Gastroesophageal reflux disease with esophagitis  Patient with gastroesophageal reflux symptoms under good control  He takes omeprazole 20 mg daily  He is very concerned about the vitamin-D deficiency and would like to try to wean off of this if this is advisable  There would be any contraindication  I would recommend the following  -try omeprazole 20 mg a day for 5 days a week and on 2 days a week Tuesdays Fridays take ranitidine 300 mg twice a day--subsequently the patient can try the ranitidine 300 mg twice a day for 3 days a week and 4 days a week except trip and titrate to tolerance  If he does very well perhaps he could switch anti early over to 300 mg twice a day  If this is not successful really no harm in remaining on omeprazole  Perhaps he could end up taking it every other day alternating with ranitidine    -continue anti reflux diet  -gradual weight loss  - ranitidine (ZANTAC) 150 MG capsule; Take 2 capsules (300 mg total) by mouth 2 (two) times a day  Dispense: 180 capsule; Refill: 3    2  Vitamin D deficiency  Under care with Dr Conor Hughes high-dose vitamin-D  Getting out in the sun line    3  Atherosclerosis of native coronary artery of native heart without angina pectoris  Stable no angina    4  Colon cancer screening  Up today  Patient had negative colonoscopy 2014 next examination would be 2020 5  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    This is a questionable diagnosis  One biopsy in 2015 revealed intestinal metaplasia  If this was taken below the EG junction would not be truly King's  Biopsies in 2017 did not reveal King's  Just of function of with a biopsy was taken    Given medication adjustment plans would recommend follow-up EGD in 2020 December    Followup Appointment:  1 year

## 2019-07-23 ENCOUNTER — OFFICE VISIT (OUTPATIENT)
Dept: FAMILY MEDICINE CLINIC | Facility: HOSPITAL | Age: 59
End: 2019-07-23
Payer: COMMERCIAL

## 2019-07-23 VITALS
HEIGHT: 65 IN | TEMPERATURE: 96.5 F | HEART RATE: 62 BPM | DIASTOLIC BLOOD PRESSURE: 82 MMHG | WEIGHT: 219 LBS | BODY MASS INDEX: 36.49 KG/M2 | SYSTOLIC BLOOD PRESSURE: 136 MMHG

## 2019-07-23 DIAGNOSIS — L03.031 PARONYCHIA OF GREAT TOE, RIGHT: Primary | ICD-10-CM

## 2019-07-23 DIAGNOSIS — R03.0 ELEVATED BLOOD PRESSURE READING IN OFFICE WITHOUT DIAGNOSIS OF HYPERTENSION: ICD-10-CM

## 2019-07-23 PROCEDURE — 1036F TOBACCO NON-USER: CPT | Performed by: INTERNAL MEDICINE

## 2019-07-23 PROCEDURE — 99214 OFFICE O/P EST MOD 30 MIN: CPT | Performed by: INTERNAL MEDICINE

## 2019-07-23 PROCEDURE — 3008F BODY MASS INDEX DOCD: CPT | Performed by: INTERNAL MEDICINE

## 2019-07-23 RX ORDER — SULFAMETHOXAZOLE AND TRIMETHOPRIM 800; 160 MG/1; MG/1
1 TABLET ORAL EVERY 12 HOURS SCHEDULED
Qty: 14 TABLET | Refills: 0 | Status: SHIPPED | OUTPATIENT
Start: 2019-07-23 | End: 2019-07-30

## 2019-07-23 NOTE — PROGRESS NOTES
Assessment/Plan:     Diagnoses and all orders for this visit:    Paronychia of great toe, right  Comments:  D/t persistent erythema/tenderness and drainage of R great toe will start abx (Bactrim d/t urinary retention with Cephalosporin in past) and encouraged Epsom soaks 3-4 times a day, told to call if no better at all in 5-7 days OR with new/worse symptoms  Orders:  -     sulfamethoxazole-trimethoprim (BACTRIM DS) 800-160 mg per tablet; Take 1 tablet by mouth every 12 (twelve) hours for 7 days    Elevated blood pressure reading in office without diagnosis of hypertension  Comments:  Reassured pt that BP slowly coming down by end of appt and BP was great at GI appt and at our last appt last week - call with any s/sx of HTN, heatlhy diet/exercise/wgt loss encouraged          Subjective:      Patient ID: Robert Huang is a 60 y o  male  HPI Pt here with c/o great toe pain  He was walking the end of last week (approx 4-5 days ago) and noted B/L great toe pain  He noted both great toes looked swollen and red by the nail base  The L toe improved quickly and is no  Longer red/swollen or tender  The R toe nail bed is  (has improved) and reddish/purple and has some drainage  He notes no F/C/N/V/D  He feels fine otherwise  BP elevated today but pt states at recent GI appt it was low  He is taking his meds as directed and has never had an issues with his BP  BP last week at our office was good at 132/78  Review of Systems   Constitutional: Negative for chills and fever  Respiratory: Negative for cough and shortness of breath  Cardiovascular: Negative for chest pain and palpitations  Gastrointestinal: Negative for diarrhea, nausea and vomiting  Skin: Positive for color change and wound  Neurological: Negative for dizziness and headaches           Objective:    /82   Pulse 62   Temp (!) 96 5 °F (35 8 °C)   Ht 5' 5" (1 651 m)   Wt 99 3 kg (219 lb)   BMI 36 44 kg/m²      Physical Exam   Constitutional: He appears well-developed and well-nourished  No distress  HENT:   Head: Normocephalic and atraumatic  Mouth/Throat: No oropharyngeal exudate  Cardiovascular: Normal rate, regular rhythm and normal heart sounds  No murmur heard  Pulmonary/Chest: Effort normal and breath sounds normal  No respiratory distress  He has no wheezes  He has no rales  Musculoskeletal:   L great toe: nail bed nml, no erythema/warmth/swelling/drainage  R great toe: erythematous-purplish discoloration at nail bed, mild clear-yellow drainage along medial nail bed, mild tenderness to palp present   Neurological: He is alert  He exhibits normal muscle tone  Skin: Skin is warm and dry  No rash noted  Psychiatric: He has a normal mood and affect  His behavior is normal    Nursing note and vitals reviewed

## 2019-07-30 ENCOUNTER — TELEPHONE (OUTPATIENT)
Dept: SLEEP CENTER | Facility: CLINIC | Age: 59
End: 2019-07-30

## 2019-07-30 NOTE — TELEPHONE ENCOUNTER
----- Message from Denny Trevino MD sent at 7/29/2019  5:29 PM EDT -----  Approved  ----- Message -----  From: Lary Mena MA  Sent: 7/26/2019   2:48 PM EDT  To: Sleep Medicine [place] Provider    This sleep study needs approval      If approved please sign and return to clerical pool  If denied please include reasons why  Also provide alternative testing if warranted  Please sign and return to clerical pool

## 2019-08-04 ENCOUNTER — HOSPITAL ENCOUNTER (OUTPATIENT)
Dept: SLEEP CENTER | Facility: HOSPITAL | Age: 59
Discharge: HOME | End: 2019-08-04
Payer: COMMERCIAL

## 2019-08-04 DIAGNOSIS — R06.83 SNORING: ICD-10-CM

## 2019-08-04 DIAGNOSIS — R40.0 DAYTIME SOMNOLENCE: ICD-10-CM

## 2019-08-04 PROCEDURE — G0399 HOME SLEEP TEST/TYPE 3 PORTA: HCPCS | Performed by: INTERNAL MEDICINE

## 2019-08-04 PROCEDURE — G0399 HOME SLEEP TEST/TYPE 3 PORTA: HCPCS

## 2019-08-06 ENCOUNTER — TRANSCRIBE ORDERS (OUTPATIENT)
Dept: ADMINISTRATIVE | Facility: HOSPITAL | Age: 59
End: 2019-08-06

## 2019-08-06 ENCOUNTER — APPOINTMENT (OUTPATIENT)
Dept: LAB | Facility: HOSPITAL | Age: 59
End: 2019-08-06
Payer: COMMERCIAL

## 2019-08-06 DIAGNOSIS — E40 EDEMA DUE TO KWASHIORKOR (HCC): Primary | ICD-10-CM

## 2019-08-06 DIAGNOSIS — E40 EDEMA DUE TO KWASHIORKOR (HCC): ICD-10-CM

## 2019-08-06 LAB
ANION GAP SERPL CALCULATED.3IONS-SCNC: 7 MMOL/L (ref 4–13)
BUN SERPL-MCNC: 14 MG/DL (ref 5–25)
CALCIUM SERPL-MCNC: 8.9 MG/DL (ref 8.3–10.1)
CHLORIDE SERPL-SCNC: 104 MMOL/L (ref 100–108)
CO2 SERPL-SCNC: 31 MMOL/L (ref 21–32)
CREAT SERPL-MCNC: 0.98 MG/DL (ref 0.6–1.3)
GFR SERPL CREATININE-BSD FRML MDRD: 84 ML/MIN/1.73SQ M
GLUCOSE P FAST SERPL-MCNC: 109 MG/DL (ref 65–99)
POTASSIUM SERPL-SCNC: 4.7 MMOL/L (ref 3.5–5.3)
SODIUM SERPL-SCNC: 142 MMOL/L (ref 136–145)

## 2019-08-06 PROCEDURE — 80048 BASIC METABOLIC PNL TOTAL CA: CPT

## 2019-08-06 PROCEDURE — 36415 COLL VENOUS BLD VENIPUNCTURE: CPT

## 2019-08-14 ENCOUNTER — TELEPHONE (OUTPATIENT)
Dept: SLEEP CENTER | Facility: CLINIC | Age: 59
End: 2019-08-14

## 2019-08-14 NOTE — LETTER
[address] [id number]    Dear Cristofer Ruiz,     Multiple attempts have been made to contact you regarding the results of your sleep study on 8/4/2019  Please contact the nursing staff at the [address] at [phone]      Sincerely,   [facility] Staff

## 2019-08-21 ENCOUNTER — TELEPHONE (OUTPATIENT)
Dept: SLEEP CENTER | Facility: CLINIC | Age: 59
End: 2019-08-21

## 2019-08-26 NOTE — TELEPHONE ENCOUNTER
Patient called for sleep study results  He lives in [facility] and will be in the [address] [facility] for a short amount of time in December  Unable to find appointment date with Dr Sara Vazquez in the sleep center  Patient given the phone number for the pulmonary practice     He will call back if he can't get into see Dr Sara Vazquez

## 2019-12-20 ENCOUNTER — OFFICE VISIT (OUTPATIENT)
Dept: PULMONOLOGY | Facility: CLINIC | Age: 59
End: 2019-12-20
Payer: COMMERCIAL

## 2019-12-20 VITALS
DIASTOLIC BLOOD PRESSURE: 70 MMHG | WEIGHT: 219 LBS | HEART RATE: 74 BPM | BODY MASS INDEX: 36.49 KG/M2 | OXYGEN SATURATION: 97 % | RESPIRATION RATE: 14 BRPM | TEMPERATURE: 97.6 F | HEIGHT: 65 IN | SYSTOLIC BLOOD PRESSURE: 120 MMHG

## 2019-12-20 DIAGNOSIS — G47.33 OBSTRUCTIVE SLEEP APNEA (ADULT) (PEDIATRIC): Primary | ICD-10-CM

## 2019-12-20 DIAGNOSIS — Z77.22 SECOND HAND SMOKE EXPOSURE: ICD-10-CM

## 2019-12-20 PROCEDURE — 99244 OFF/OP CNSLTJ NEW/EST MOD 40: CPT | Performed by: INTERNAL MEDICINE

## 2019-12-20 NOTE — PROGRESS NOTES
Sleep Consultation   Natalio Mcgarry 60 y o  male MRN: [id number]      Reason for consultation: CLIFF    Requesting physician: Dr German Mcnulty    Assessment/Plan  60 y o  M with PMHx of CAD s/p stent, SVT, GERD HTN, and CLIFF who comes in for management of CLIFF  1  Severe CLIFF (AHI - 58 5) - to start AutoPAP 5-20 (DME - cookie)      -  Start trial of 5-20 today, follow compliance data in 1- 2 months      -  Discussed in depth the results of the sleep study and treatment pitfalls prior to initiation  Answered all questions regarding treatment      -  I also discussed in depth the risk of leaving sleep apnea untreated including hypertension, heart failure, arrhythmia, MI and stroke  -  He lives in [place] [facility] most of the year  He will be making a trip back in April at which point we will discuss compliance  2   Second hand smoking exposure  Mild expiratory wheezing noted on exam today  For now he would like to hold off PFTs  Will consider at next visit  He denies dyspnea during the day  History of Present Illness   HPI:  Natalio Mcgarry is a 60 y o  male with PMHx as below who comes in for management of newly diagnosed CLIFF  Patient notes symptoms of difficulty falling asleep, difficulty staying asleep, snoring, excessive daytime sleepiness despite an Wilson of only 3  He denies awakenings with gasping, witnessed apneas, morning headaches, or awakenings with dry mouth  He does admit to some difficulty with memory and concentration  he denies symptoms of restless legs  he denies symptoms of cataplexy, sleep paralysis, hypnopompic or hypnagogic hallucinations  Sleep History:  he goes to bed at approximately 9:30 - 10, will get to sleep in in a few min, will get out of bed at 4:40 am   he will get up 2 times at night to go to the bathroom  It will then take a few minutes to fall back asleep    he does nor typically nap during the day  ROS:   Review of Systems   Constitutional: Positive for fatigue  Negative for appetite change  HENT: Positive for tinnitus  Negative for congestion, facial swelling, mouth sores, postnasal drip and sinus pain  Eyes: Negative  Respiratory: Negative  Cardiovascular: Positive for palpitations  Gastrointestinal: Negative  Endocrine: Negative  Genitourinary: Negative  Musculoskeletal: Negative  Joint pain   Skin: Negative  Allergic/Immunologic: Negative  Neurological: Negative  Hematological: Negative for adenopathy  Bruises/bleeds easily  Psychiatric/Behavioral: Negative  Historical Information   Past Medical History:   Diagnosis Date    Acute hepatitis A     Acute prostatitis     Coronary artery disease     Dysphagia     last assessed - 24Jun2013    GERD (gastroesophageal reflux disease)     History of radiation exposure to spine     histroy of exposure to radiation    Plantar fasciitis      Past Surgical History:   Procedure Laterality Date    COLONOSCOPY      Complete Colonoscopy - 10 yrs, onset 7/31/2009; 7/9/2014 - grade 4 int hemorrhoids, mild diverticulosis in mid sigmoid colon, onset [id number]    CORONARY ANGIOPLASTY WITH STENT PLACEMENT      DENTAL SURGERY      ESOPHAGOGASTRODUODENOSCOPY  04/02/2015    Diagnostic EGD 4/2/15 - gastritis and duodenitis;  Sm hiatal hernia, gastric polyp, onset 27 Jun 2012    PROSTATE BIOPSY  2015    needle biopsy     Family History   Problem Relation Age of Onset    Hypertension Mother     Breast cancer Mother     Hyperlipidemia Mother     Osteoporosis Mother     Hypertension Father     Coronary artery disease Paternal Grandfather     Colon cancer Neg Hx     Colon polyps Neg Hx      Social History     Socioeconomic History    Marital status: /Civil Union     Spouse name: Not on file    Number of children: Not on file    Years of education: Not on file    Highest education level: Not on file   Occupational History    Occupation:  - full-time   Social Needs    Financial resource strain: Not on file    Food insecurity:     Worry: Not on file     Inability: Not on file    Transportation needs:     Medical: Not on file     Non-medical: Not on file   Tobacco Use    Smoking status: Never Smoker    Smokeless tobacco: Never Used   Substance and Sexual Activity    Alcohol use: Not Currently     Comment: social; occasional alcohol use    Drug use: No    Sexual activity: Yes   Lifestyle    Physical activity:     Days per week: Not on file     Minutes per session: Not on file    Stress: Not on file   Relationships    Social connections:     Talks on phone: Not on file     Gets together: Not on file     Attends Tenriism service: Not on file     Active member of club or organization: Not on file     Attends meetings of clubs or organizations: Not on file     Relationship status: Not on file    Intimate partner violence:     Fear of current or ex partner: Not on file     Emotionally abused: Not on file     Physically abused: Not on file     Forced sexual activity: Not on file   Other Topics Concern    Not on file   Social History Narrative    Not on file       Occupational History: teacher/muscian     Meds/Allergies   Allergies   Allergen Reactions    Ceftin [Cefuroxime] Other (See Comments)     Difficulty urinating        Home medications:  Prior to Admission medications    Medication Sig Start Date End Date Taking?  Authorizing Provider   aspirin 81 MG tablet Take 1 tablet by mouth daily 6/21/12  Yes Historical Provider, MD   atorvastatin (LIPITOR) 40 mg tablet Take by mouth   Yes Historical Provider, MD   clopidogrel (PLAVIX) 75 mg tablet Take 1 tablet by mouth daily 6/21/12  Yes Historical Provider, MD   ezetimibe (ZETIA) 10 mg tablet Take 1 tablet by mouth daily 6/21/12  Yes Historical Provider, MD   fexofenadine (ALLEGRA) 180 MG tablet Take 1 tablet by mouth daily as needed 6/24/11  Yes Historical Provider, MD   metoprolol succinate (TOPROL-XL) 50 mg 24 hr tablet Take 75 mg by mouth daily 12/18/18  Yes Historical Provider, MD   omeprazole (PRILOSEC) 20 mg delayed release capsule Take by mouth   Yes Historical Provider, MD   Vitamin D, Ergocalciferol, 2000 units CAPS Take 2,000 Units by mouth daily   Yes Historical Provider, MD   ranitidine (ZANTAC) 150 MG capsule Take 2 capsules (300 mg total) by mouth 2 (two) times a day  Patient not taking: Reported on 12/20/2019 7/22/19 12/20/19  Nichelle White MD       Vitals:   Blood pressure 120/70, pulse 74, temperature 97 6 °F (36 4 °C), resp  rate 14, height 5' 5" (1 651 m), weight 99 3 kg (219 lb), SpO2 97 % , RA, Body mass index is 36 44 kg/m²  Neck Circumference: 16  5(inches)    Physical Exam  General: Pleasant, Awake alert and oriented x 3, conversant without conversational dyspnea, NAD, normal affect  HEENT:  PERRL, Sclera noninjected, nonicteric OU, Nares patent,  no craniofacial abnormalities, Mucous membranes, moist, no oral lesions, normal dentition, Mallampati class 4  NECK: Trachea midline, no accessory muscle use, no stridor, no cervical or supraclavicular adenopathy, JVP not elevated  CARDIAC: Reg, single s1/S2, no m/r/g  PULM: mild expiratory wheezing  No rhonchi or rales  ABD: Normoactive bowel sounds, soft nontender, nondistended, no rebound, no rigidity, no guarding  EXT: No cyanosis, no clubbing, no edema, normal capillary refill  NEURO: no focal neurologic deficits, AAOx3, moving all extremities appropriately    Labs: I have personally reviewed pertinent lab results    Lab Results   Component Value Date    WBC 6 94 08/04/2018    HGB 16 3 08/04/2018    HCT 46 3 08/04/2018    MCV 87 08/04/2018     08/04/2018      Lab Results   Component Value Date    GLUCOSE 97 07/14/2015    CALCIUM 8 9 08/06/2019     07/14/2015    K 4 7 08/06/2019    CO2 31 08/06/2019     08/06/2019    BUN 14 08/06/2019    CREATININE 0 98 08/06/2019     No results found for: IRON, TIBC, FERRITIN  Lab Results   Component Value Date    AXJOOSNF63 567 06/18/2015     Lab Results   Component Value Date    FOLATE >24 0 06/18/2015       Sleep studies:  Home study (my interpretation)  Mr Janie Conn had an increased number of sleep related respiratory events (HYACINTH - 58 5) which is consistent with severe obstructive sleep apnea  Therefore, I recommend a trial of auto-titrating CPAP set 5-20 cc of H2O pressure  Compliance should be evaluated in 1-2 months                                            DO Arianna Penn 72 Sleep Physician

## 2019-12-20 NOTE — LETTER
December 20, 2019     Keenan Mccann, [address]  [address]  [address] [address] [address]    Patient: Tiffany Medina   YOB: 1960   Date of Visit: 12/20/2019       Dear Dr Katarina Collazo: Thank you for referring Stacey Paiz to me for evaluation  Below are my notes for this consultation  If you have questions, please do not hesitate to call me  I look forward to following your patient along with you  Sincerely,        Mallorie Morse DO        CC: No Recipients  Mallorie Morse DO  12/20/2019  3:33 PM  Sign at close encounter  Sleep Consultation   Tiffany Medina 60 y o  male MRN: [id number]      Reason for consultation: CLIFF    Requesting physician: Dr Katarina Collazo    Assessment/Plan  60 y o  M with PMHx of CAD s/p stent, SVT, GERD HTN, and CLIFF who comes in for management of CLIFF  1  Severe CLIFF (AHI - 58 5) - to start AutoPAP 5-20 [place] [place]      -  Start trial of 5-20 today, follow compliance data in 1- 2 months      -  Discussed in depth the results of the sleep study and treatment pitfalls prior to initiation  Answered all questions regarding treatment      -  I also discussed in depth the risk of leaving sleep apnea untreated including hypertension, heart failure, arrhythmia, MI and stroke  -  He lives in [place] most of the year  He will be making a trip back in April at which point we will discuss compliance  2   Second hand smoking exposure  Mild expiratory wheezing noted on exam today  For now he would like to hold off PFTs  Will consider at next visit  He denies dyspnea during the day  History of Present Illness   HPI:  Tiffany Medina is a 60 y o  male with PMHx as below who comes in for management of newly diagnosed CLIFF  Patient notes symptoms of difficulty falling asleep, difficulty staying asleep, snoring, excessive daytime sleepiness despite an [place] of only 3    He denies awakenings with gasping, witnessed apneas, morning headaches, or awakenings with dry mouth  He does admit to some difficulty with memory and concentration  he denies symptoms of restless legs  he denies symptoms of cataplexy, sleep paralysis, hypnopompic or hypnagogic hallucinations  Sleep History:  he goes to bed at approximately 9:30 - 10, will get to sleep in in a few min, will get out of bed at 4:40 am   he will get up 2 times at night to go to the bathroom  It will then take a few minutes to fall back asleep    he does nor typically nap during the day  ROS:   Review of Systems   Constitutional: Positive for fatigue  Negative for appetite change  HENT: Positive for tinnitus  Negative for congestion, facial swelling, mouth sores, postnasal drip and sinus pain  Eyes: Negative  Respiratory: Negative  Cardiovascular: Positive for palpitations  Gastrointestinal: Negative  Endocrine: Negative  Genitourinary: Negative  Musculoskeletal: Negative  Joint pain   Skin: Negative  Allergic/Immunologic: Negative  Neurological: Negative  Hematological: Negative for adenopathy  Bruises/bleeds easily  Psychiatric/Behavioral: Negative  Historical Information   Past Medical History:   Diagnosis Date    Acute hepatitis A     Acute prostatitis     Coronary artery disease     Dysphagia     last assessed - [id number]    GERD (gastroesophageal reflux disease)     History of radiation exposure to spine     histroy of exposure to radiation    Plantar fasciitis      Past Surgical History:   Procedure Laterality Date    COLONOSCOPY      Complete Colonoscopy - 10 yrs, onset 7/31/2009; 7/9/2014 - grade 4 int hemorrhoids, mild diverticulosis in mid sigmoid colon, onset [id number]    CORONARY ANGIOPLASTY WITH STENT PLACEMENT      DENTAL SURGERY      ESOPHAGOGASTRODUODENOSCOPY  04/02/2015    Diagnostic EGD 4/2/15 - gastritis and duodenitis;  Sm hiatal hernia, gastric polyp, onset 27 Jun 2012    PROSTATE BIOPSY  2015    needle biopsy Family History   Problem Relation Age of Onset    Hypertension Mother     Breast cancer Mother     Hyperlipidemia Mother     Osteoporosis Mother     Hypertension Father     Coronary artery disease Paternal Grandfather     Colon cancer Neg Hx     Colon polyps Neg Hx      Social History     Socioeconomic History    Marital status: /Civil Union     Spouse name: Not on file    Number of children: Not on file    Years of education: Not on file    Highest education level: Not on file   Occupational History    Occupation:  - full-time   Social Needs    Financial resource strain: Not on file    Food insecurity:     Worry: Not on file     Inability: Not on file    Transportation needs:     Medical: Not on file     Non-medical: Not on file   Tobacco Use    Smoking status: Never Smoker    Smokeless tobacco: Never Used   Substance and Sexual Activity    Alcohol use: Not Currently     Comment: social; occasional alcohol use    Drug use: No    Sexual activity: Yes   Lifestyle    Physical activity:     Days per week: Not on file     Minutes per session: Not on file    Stress: Not on file   Relationships    Social connections:     Talks on phone: Not on file     Gets together: Not on file     Attends [employer] service: Not on file     Active member of club or organization: Not on file     Attends meetings of clubs or organizations: Not on file     Relationship status: Not on file    Intimate partner violence:     Fear of current or ex partner: Not on file     Emotionally abused: Not on file     Physically abused: Not on file     Forced sexual activity: Not on file   Other Topics Concern    Not on file   Social History Narrative    Not on file       Occupational History: teacher/muscian     Meds/Allergies   Allergies   Allergen Reactions    Ceftin [Cefuroxime] Other (See Comments)     Difficulty urinating        Home medications:  Prior to Admission medications    Medication Sig Start Date End Date Taking? Authorizing Provider   aspirin 81 MG tablet Take 1 tablet by mouth daily 6/21/12  Yes Historical Provider, MD   atorvastatin (LIPITOR) 40 mg tablet Take by mouth   Yes Historical Provider, MD   clopidogrel (PLAVIX) 75 mg tablet Take 1 tablet by mouth daily 6/21/12  Yes Historical Provider, MD   ezetimibe (ZETIA) 10 mg tablet Take 1 tablet by mouth daily 6/21/12  Yes Historical Provider, MD   fexofenadine (ALLEGRA) 180 MG tablet Take 1 tablet by mouth daily as needed 6/24/11  Yes Historical Provider, MD   metoprolol succinate (TOPROL-XL) 50 mg 24 hr tablet Take 75 mg by mouth daily 12/18/18  Yes Historical Provider, MD   omeprazole (PRILOSEC) 20 mg delayed release capsule Take by mouth   Yes Historical Provider, MD   Vitamin D, Ergocalciferol, 2000 units CAPS Take 2,000 Units by mouth daily   Yes Historical Provider, MD   ranitidine (ZANTAC) 150 MG capsule Take 2 capsules (300 mg total) by mouth 2 (two) times a day  Patient not taking: Reported on 12/20/2019 7/22/19 12/20/19  Soha Garcia MD       Vitals:   Blood pressure 120/70, pulse 74, temperature 97 6 °F (36 4 °C), resp  rate 14, height 5' 5" (1 651 m), weight 99 3 kg (219 lb), SpO2 97 % , RA, Body mass index is 36 44 kg/m²  Neck Circumference: 16  5(inches)    Physical Exam  General: Pleasant, Awake alert and oriented x 3, conversant without conversational dyspnea, NAD, normal affect  HEENT:  PERRL, Sclera noninjected, nonicteric OU, Nares patent,  no craniofacial abnormalities, Mucous membranes, moist, no oral lesions, normal dentition, Mallampati class 4  NECK: Trachea midline, no accessory muscle use, no stridor, no cervical or supraclavicular adenopathy, JVP not elevated  CARDIAC: Reg, single s1/S2, no m/r/g  PULM: mild expiratory wheezing  No rhonchi or rales      ABD: Normoactive bowel sounds, soft nontender, nondistended, no rebound, no rigidity, no guarding  EXT: No cyanosis, no clubbing, no edema, normal capillary refill  NEURO: no focal neurologic deficits, AAOx3, moving all extremities appropriately    Labs: I have personally reviewed pertinent lab results  Lab Results   Component Value Date    WBC 6 94 08/04/2018    HGB 16 3 08/04/2018    HCT 46 3 08/04/2018    MCV 87 08/04/2018     08/04/2018      Lab Results   Component Value Date    GLUCOSE 97 07/14/2015    CALCIUM 8 9 08/06/2019     07/14/2015    K 4 7 08/06/2019    CO2 31 08/06/2019     08/06/2019    BUN 14 08/06/2019    CREATININE 0 98 08/06/2019     No results found for: IRON, TIBC, FERRITIN  Lab Results   Component Value Date    [id number] 407 06/18/2015     Lab Results   Component Value Date    FOLATE >24 0 06/18/2015       Sleep studies:  Home study (my interpretation)  Mr Andrea Middleton had an increased number of sleep related respiratory events (HYACINTH ? 58 5) which is consistent with severe obstructive sleep apnea  Therefore, I recommend a trial of auto-titrating CPAP set 5-20 cc of H2O pressure  Compliance should be evaluated in 1-2 months                                           Payton Bloch, DO [address] Sleep Physician

## 2020-05-30 ENCOUNTER — TELEPHONE (OUTPATIENT)
Dept: OTHER | Facility: OTHER | Age: 60
End: 2020-05-30

## 2021-07-07 DIAGNOSIS — N40.2 PROSTATE NODULE: Primary | ICD-10-CM

## 2021-07-19 ENCOUNTER — TELEPHONE (OUTPATIENT)
Dept: UROLOGY | Facility: AMBULATORY SURGERY CENTER | Age: 61
End: 2021-07-19

## 2021-07-19 NOTE — TELEPHONE ENCOUNTER
Patient's father called to say his son is currently in [place]  He is a  over there and will not be coming back this summer  He will call to schedule when he is back

## 2023-05-01 ENCOUNTER — TELEPHONE (OUTPATIENT)
Dept: UROLOGY | Facility: AMBULATORY SURGERY CENTER | Age: 63
End: 2023-05-01

## 2023-05-01 DIAGNOSIS — Z12.5 PROSTATE CANCER SCREENING: ICD-10-CM

## 2023-05-01 DIAGNOSIS — N20.0 NEPHROLITHIASIS: Primary | ICD-10-CM

## 2023-05-01 NOTE — TELEPHONE ENCOUNTER
Patient called to schedule f/u appointment with Dr Heather Harrison or Dr Ghosh Devon  He lives in [place] but comes to get his PSA screening when he comes to the states  He would like to get his PSA and kidney functions checked, asked if we could put orders in the Epic       Patient can be reached at [phone]

## 2023-05-01 NOTE — TELEPHONE ENCOUNTER
Returned call to pt  Unable to leave detailed msg no comm consent on file    If pt calls back he can be advised lab orders have been placed in his chart per his request

## 2023-06-27 ENCOUNTER — APPOINTMENT (OUTPATIENT)
Dept: LAB | Facility: HOSPITAL | Age: 63
End: 2023-06-27
Payer: COMMERCIAL

## 2023-06-27 DIAGNOSIS — N20.0 NEPHROLITHIASIS: ICD-10-CM

## 2023-06-27 DIAGNOSIS — Z12.5 PROSTATE CANCER SCREENING: ICD-10-CM

## 2023-06-27 LAB
ANION GAP SERPL CALCULATED.3IONS-SCNC: 2 MMOL/L
BUN SERPL-MCNC: 16 MG/DL (ref 5–25)
CALCIUM SERPL-MCNC: 9 MG/DL (ref 8.3–10.1)
CHLORIDE SERPL-SCNC: 107 MMOL/L (ref 96–108)
CO2 SERPL-SCNC: 28 MMOL/L (ref 21–32)
CREAT SERPL-MCNC: 1.16 MG/DL (ref 0.6–1.3)
GFR SERPL CREATININE-BSD FRML MDRD: 66 ML/MIN/1.73SQ M
GLUCOSE P FAST SERPL-MCNC: 104 MG/DL (ref 65–99)
POTASSIUM SERPL-SCNC: 4.2 MMOL/L (ref 3.5–5.3)
PSA SERPL-MCNC: 0.39 NG/ML (ref 0–4)
SODIUM SERPL-SCNC: 137 MMOL/L (ref 135–147)

## 2023-06-27 PROCEDURE — 36415 COLL VENOUS BLD VENIPUNCTURE: CPT

## 2023-06-27 PROCEDURE — G0103 PSA SCREENING: HCPCS

## 2023-06-27 PROCEDURE — 80048 BASIC METABOLIC PNL TOTAL CA: CPT

## 2023-07-07 ENCOUNTER — HOSPITAL ENCOUNTER (EMERGENCY)
Facility: HOSPITAL | Age: 63
Discharge: HOME | End: 2023-07-07
Attending: EMERGENCY MEDICINE
Payer: COMMERCIAL

## 2023-07-07 VITALS
OXYGEN SATURATION: 96 % | TEMPERATURE: 99.4 F | DIASTOLIC BLOOD PRESSURE: 78 MMHG | RESPIRATION RATE: 18 BRPM | SYSTOLIC BLOOD PRESSURE: 126 MMHG | HEART RATE: 70 BPM

## 2023-07-07 DIAGNOSIS — I47.1 SVT (SUPRAVENTRICULAR TACHYCARDIA) (HCC): Primary | ICD-10-CM

## 2023-07-07 LAB
ANION GAP SERPL CALCULATED.3IONS-SCNC: 8 MMOL/L
ATRIAL RATE: 78 BPM
BASOPHILS # BLD AUTO: 0.02 THOUSANDS/ÂΜL (ref 0–0.1)
BASOPHILS NFR BLD AUTO: 0 % (ref 0–1)
BUN SERPL-MCNC: 19 MG/DL (ref 5–25)
CALCIUM SERPL-MCNC: 8.9 MG/DL (ref 8.4–10.2)
CHLORIDE SERPL-SCNC: 108 MMOL/L (ref 96–108)
CO2 SERPL-SCNC: 24 MMOL/L (ref 21–32)
CREAT SERPL-MCNC: 1.01 MG/DL (ref 0.6–1.3)
EOSINOPHIL # BLD AUTO: 0.44 THOUSAND/ÂΜL (ref 0–0.61)
EOSINOPHIL NFR BLD AUTO: 8 % (ref 0–6)
ERYTHROCYTE [DISTWIDTH] IN BLOOD BY AUTOMATED COUNT: 13.1 % (ref 11.6–15.1)
GFR SERPL CREATININE-BSD FRML MDRD: 78 ML/MIN/1.73SQ M
GLUCOSE SERPL-MCNC: 120 MG/DL (ref 65–140)
HCT VFR BLD AUTO: 46.9 % (ref 36.5–49.3)
HGB BLD-MCNC: 15.6 G/DL (ref 12–17)
IMM GRANULOCYTES # BLD AUTO: 0.02 THOUSAND/UL (ref 0–0.2)
IMM GRANULOCYTES NFR BLD AUTO: 0 % (ref 0–2)
LYMPHOCYTES # BLD AUTO: 1.19 THOUSANDS/ÂΜL (ref 0.6–4.47)
LYMPHOCYTES NFR BLD AUTO: 20 % (ref 14–44)
MCH RBC QN AUTO: 30.5 PG (ref 26.8–34.3)
MCHC RBC AUTO-ENTMCNC: 33.3 G/DL (ref 31.4–37.4)
MCV RBC AUTO: 92 FL (ref 82–98)
MONOCYTES # BLD AUTO: 0.4 THOUSAND/ÂΜL (ref 0.17–1.22)
MONOCYTES NFR BLD AUTO: 7 % (ref 4–12)
NEUTROPHILS # BLD AUTO: 3.76 THOUSANDS/ÂΜL (ref 1.85–7.62)
NEUTS SEG NFR BLD AUTO: 65 % (ref 43–75)
NRBC BLD AUTO-RTO: 0 /100 WBCS
P AXIS: 62 DEGREES
PLATELET # BLD AUTO: 163 THOUSANDS/UL (ref 149–390)
PMV BLD AUTO: 10.3 FL (ref 8.9–12.7)
POTASSIUM SERPL-SCNC: 3.9 MMOL/L (ref 3.5–5.3)
PR INTERVAL: 140 MS
QRS AXIS: 66 DEGREES
QRSD INTERVAL: 84 MS
QT INTERVAL: 354 MS
QTC INTERVAL: 403 MS
RBC # BLD AUTO: 5.12 MILLION/UL (ref 3.88–5.62)
SODIUM SERPL-SCNC: 140 MMOL/L (ref 135–147)
T WAVE AXIS: 9 DEGREES
VENTRICULAR RATE: 78 BPM
WBC # BLD AUTO: 5.83 THOUSAND/UL (ref 4.31–10.16)

## 2023-07-07 PROCEDURE — 80048 BASIC METABOLIC PNL TOTAL CA: CPT

## 2023-07-07 PROCEDURE — 36415 COLL VENOUS BLD VENIPUNCTURE: CPT

## 2023-07-07 PROCEDURE — 85025 COMPLETE CBC W/AUTO DIFF WBC: CPT

## 2023-07-07 PROCEDURE — 93010 ELECTROCARDIOGRAM REPORT: CPT

## 2023-07-07 PROCEDURE — 93005 ELECTROCARDIOGRAM TRACING: CPT

## 2023-07-07 PROCEDURE — 99284 EMERGENCY DEPT VISIT MOD MDM: CPT

## 2023-07-07 NOTE — ED PROVIDER NOTES
History  Chief Complaint   Patient presents with   • Medical Problem     [place]. Pt reports starting to cough and checked his heart rate, which was in the 170s. Hx of SVT. Pt tried vagal maneuvers with no success. 6 of adenosine given and converted patient out of SVT. Patient has no complaints at this time. REILLY Fonseca is a 60 y.o. male with PMH SVT who presents to the emergency department after an episode of SVT. He states he was shopping and had a coughing episode, which typically triggers his SVT. He checked his pulse and it was fast, he tried putting ice on his head and took an extra dose of metoprolol without improvement. Per EMS he was in SVT with HR 170s and was given 6 mg adenosine with improvement. Rhythm strips from EMS show SVT which converts to normal sinus rhythm. He denies chest pain or shortness of breath. Prior to Admission Medications   Prescriptions Last Dose Informant Patient Reported? Taking?    Vitamin D, Ergocalciferol, 2000 units CAPS  Self Yes Yes   Sig: Take 2,000 Units by mouth daily   aspirin 81 MG tablet  Self Yes Yes   Sig: Take 1 tablet by mouth daily   atorvastatin (LIPITOR) 40 mg tablet  Self Yes Yes   Sig: Take by mouth   clopidogrel (PLAVIX) 75 mg tablet  Self Yes Yes   Sig: Take 1 tablet by mouth daily   ezetimibe (ZETIA) 10 mg tablet  Self Yes Yes   Sig: Take 1 tablet by mouth daily   fexofenadine (ALLEGRA) 180 MG tablet  Self Yes Yes   Sig: Take 1 tablet by mouth daily as needed (Allergies)   metoprolol succinate (TOPROL-XL) 50 mg 24 hr tablet  Self Yes Yes   Sig: Take 75 mg by mouth daily   omeprazole (PRILOSEC) 20 mg delayed release capsule  Self Yes Yes   Sig: Take by mouth      Facility-Administered Medications: None       Past Medical History:   Diagnosis Date   • Acute hepatitis A    • Acute prostatitis    • Coronary artery disease    • Dysphagia     last assessed - [id number]   • GERD (gastroesophageal reflux disease)    • History of radiation exposure to spine     histroy of exposure to radiation   • Plantar fasciitis        Past Surgical History:   Procedure Laterality Date   • COLONOSCOPY      Complete Colonoscopy - 10 yrs, onset 7/31/2009; 7/9/2014 - grade 4 int hemorrhoids, mild diverticulosis in mid sigmoid colon, onset [id number]   • CORONARY ANGIOPLASTY WITH STENT PLACEMENT     • DENTAL SURGERY     • ESOPHAGOGASTRODUODENOSCOPY  04/02/2015    Diagnostic EGD 4/2/15 - gastritis and duodenitis; Sm hiatal hernia, gastric polyp, onset 27 Jun 2012   • PROSTATE BIOPSY  2015    needle biopsy       Family History   Problem Relation Age of Onset   • Hypertension Mother    • Breast cancer Mother    • Hyperlipidemia Mother    • Osteoporosis Mother    • Hypertension Father    • Coronary artery disease Paternal Grandfather    • Colon cancer Neg Hx    • Colon polyps Neg Hx      I have reviewed and agree with the history as documented. E-Cigarette/Vaping     E-Cigarette/Vaping Substances     Social History     Tobacco Use   • Smoking status: Never   • Smokeless tobacco: Never   Substance Use Topics   • Alcohol use: Not Currently     Comment: social; occasional alcohol use   • Drug use: No       Home medications:  Prior to Admission Medications   Prescriptions Last Dose Informant Patient Reported? Taking?    Vitamin D, Ergocalciferol, 2000 units CAPS  Self Yes Yes   Sig: Take 2,000 Units by mouth daily   aspirin 81 MG tablet  Self Yes Yes   Sig: Take 1 tablet by mouth daily   atorvastatin (LIPITOR) 40 mg tablet  Self Yes Yes   Sig: Take by mouth   clopidogrel (PLAVIX) 75 mg tablet  Self Yes Yes   Sig: Take 1 tablet by mouth daily   ezetimibe (ZETIA) 10 mg tablet  Self Yes Yes   Sig: Take 1 tablet by mouth daily   fexofenadine (ALLEGRA) 180 MG tablet  Self Yes Yes   Sig: Take 1 tablet by mouth daily as needed (Allergies)   metoprolol succinate (TOPROL-XL) 50 mg 24 hr tablet  Self Yes Yes   Sig: Take 75 mg by mouth daily   omeprazole (PRILOSEC) 20 mg delayed release capsule  Self Yes Yes   Sig: Take by mouth      Facility-Administered Medications: None     Allergies: Allergies   Allergen Reactions   • Ceftin [Cefuroxime] Other (See Comments)     Difficulty urinating         Review of Systems   Constitutional: Negative for fever. Respiratory: Negative for shortness of breath. Cardiovascular: Negative for chest pain. Gastrointestinal: Negative for abdominal pain and vomiting. Neurological: Negative for syncope. All other systems reviewed and are negative. Physical Exam  ED Triage Vitals [07/07/23 1219]   Temperature Pulse Respirations Blood Pressure SpO2   99.4 °F (37.4 °C) 78 18 142/78 96 %      Temp Source Heart Rate Source Patient Position - Orthostatic VS BP Location FiO2 (%)   Oral Monitor Lying Left arm --      Pain Score       --             Orthostatic Vital Signs  Vitals:    07/07/23 1219 07/07/23 1330   BP: 142/78 126/78   Pulse: 78 70   Patient Position - Orthostatic VS: Lying Lying       Physical Exam  Vitals and nursing note reviewed. Constitutional:       General: He is not in acute distress. Appearance: He is not toxic-appearing or diaphoretic. HENT:      Head: Normocephalic. Mouth/Throat:      Mouth: Mucous membranes are moist.   Eyes:      Pupils: Pupils are equal, round, and reactive to light. Cardiovascular:      Rate and Rhythm: Normal rate and regular rhythm. Heart sounds: No murmur heard. Pulmonary:      Effort: Pulmonary effort is normal. No respiratory distress. Breath sounds: Normal breath sounds. No wheezing, rhonchi or rales. Abdominal:      General: Abdomen is flat. There is no distension. Palpations: Abdomen is soft. Tenderness: There is no abdominal tenderness. There is no guarding or rebound. Musculoskeletal:      Right lower leg: No edema. Left lower leg: No edema. Skin:     General: Skin is warm and dry. Neurological:      Mental Status: He is alert.          ED Medications  Medications - No data to display    Diagnostic Studies  Results Reviewed     Procedure Component Value Units Date/Time    Basic metabolic panel [[id number]] Collected: 07/07/23 1308    Lab Status: Final result Specimen: Blood from Arm, Right Updated: 07/07/23 1329     Sodium 140 mmol/L      Potassium 3.9 mmol/L      Chloride 108 mmol/L      CO2 24 mmol/L      ANION GAP 8 mmol/L      BUN 19 mg/dL      Creatinine 1.01 mg/dL      Glucose 120 mg/dL      Calcium 8.9 mg/dL      eGFR 78 ml/min/1.73sq m     Narrative:      [place] guidelines for Chronic Kidney Disease (CKD):   •  Stage 1 with normal or high GFR (GFR > 90 mL/min/1.73 square meters)  •  Stage 2 Mild CKD (GFR = 60-89 mL/min/1.73 square meters)  •  Stage 3A Moderate CKD (GFR = 45-59 mL/min/1.73 square meters)  •  Stage 3B Moderate CKD (GFR = 30-44 mL/min/1.73 square meters)  •  Stage 4 Severe CKD (GFR = 15-29 mL/min/1.73 square meters)  •  Stage 5 End Stage CKD (GFR <15 mL/min/1.73 square meters)  Note: GFR calculation is accurate only with a steady state creatinine    CBC and differential [[id number]]  (Abnormal) Collected: 07/07/23 1308    Lab Status: Final result Specimen: Blood from Arm, Right Updated: 07/07/23 1315     WBC 5.83 Thousand/uL      RBC 5.12 Million/uL      Hemoglobin 15.6 g/dL      Hematocrit 46.9 %      MCV 92 fL      MCH 30.5 pg      MCHC 33.3 g/dL      RDW 13.1 %      MPV 10.3 fL      Platelets 513 Thousands/uL      nRBC 0 /100 WBCs      Neutrophils Relative 65 %      Immat GRANS % 0 %      Lymphocytes Relative 20 %      Monocytes Relative 7 %      Eosinophils Relative 8 %      Basophils Relative 0 %      Neutrophils Absolute 3.76 Thousands/µL      Immature Grans Absolute 0.02 Thousand/uL      Lymphocytes Absolute 1.19 Thousands/µL      Monocytes Absolute 0.40 Thousand/µL      Eosinophils Absolute 0.44 Thousand/µL      Basophils Absolute 0.02 Thousands/µL                  No orders to display Procedures  ECG 12 Lead Documentation Only    Date/Time: 7/7/2023 12:39 PM    Performed by: Thais Bond MD  Authorized by: Thais Bond MD    ECG reviewed by me, the ED Provider: yes    Patient location:  ED  Previous ECG:     Previous ECG:  Compared to current    Similarity:  No change  Interpretation:     Interpretation: non-specific    Rate:     ECG rate:  78    ECG rate assessment: normal    Rhythm:     Rhythm: sinus rhythm    Ectopy:     Ectopy: none    QRS:     QRS axis:  Normal    QRS intervals:  Normal  Conduction:     Conduction: normal    ST segments:     ST segments:  Normal  T waves:     T waves: inverted      Inverted:  III          ED Course                             SBIRT 22yo+    Flowsheet Row Most Recent Value   Initial Alcohol Screen: US AUDIT-C     1. How often do you have a drink containing alcohol? 0 Filed at: 07/07/2023 1222   2. How many drinks containing alcohol do you have on a typical day you are drinking? 0 Filed at: 07/07/2023 1222   3a. Male UNDER 65: How often do you have five or more drinks on one occasion? 0 Filed at: 07/07/2023 1222   Audit-C Score 0 Filed at: 07/07/2023 1222   LILIBETH: How many times in the past year have you. .. Used an illegal drug or used a prescription medication for non-medical reasons? Never Filed at: 07/07/2023 1222                [facility]  FRANCK Doan is a 60 y.o. male with PMH SVT who presents to the emergency department after an episode of SVT which was successfully converted to sinus rhythm with adenosine by EMS. Workup including vital signs, physical exam, EKG and labs. EKG without acute ischemic changes and labs largely unremarkable. Patient back to baseline, no further SVT while observed on telemetry. Stable for discharge home with Cardiology follow up, discharge instructions and return precautions given.        Disposition  Final diagnoses:   SVT (supraventricular tachycardia) ([address])     Time reflects when diagnosis was documented in both MDM as applicable and the Disposition within this note     Time User Action Codes Description Comment    7/7/2023  1:35 PM Livia Lima Add [I47.1] SVT (supraventricular tachycardia) [facility])       ED Disposition     ED Disposition   Discharge    Condition   Stable    Date/Time   Fri Jul 7, 2023  1:35 PM    [facility] discharge to home/self care. Follow-up Information    None         Discharge Medication List as of 7/7/2023  1:35 PM      CONTINUE these medications which have NOT CHANGED    Details   aspirin 81 MG tablet Take 1 tablet by mouth daily, Starting Thu 6/21/2012, Historical Med      atorvastatin (LIPITOR) 40 mg tablet Take by mouth, Historical Med      clopidogrel (PLAVIX) 75 mg tablet Take 1 tablet by mouth daily, Starting Thu 6/21/2012, Historical Med      ezetimibe (ZETIA) 10 mg tablet Take 1 tablet by mouth daily, Starting Thu 6/21/2012, Historical Med      fexofenadine (ALLEGRA) 180 MG tablet Take 1 tablet by mouth daily as needed (Allergies), Starting Fri 6/24/2011, Historical Med      metoprolol succinate (TOPROL-XL) 50 mg 24 hr tablet Take 75 mg by mouth daily, Starting Tue 12/18/2018, Historical Med      omeprazole (PRILOSEC) 20 mg delayed release capsule Take by mouth, Historical Med      Vitamin D, Ergocalciferol, 2000 units CAPS Take 2,000 Units by mouth daily, Historical Med             No discharge procedures on file. PDMP Review     None           ED Provider  Attending physically available and evaluated Robinson Arellano. I managed the patient along with the ED Attending. Electronically Signed by    Portions of the record may have been created with voice recognition software. Occasional wrong word or "sound a like" substitutions may have occurred due to the inherent limitations of voice recognition software.   Read the chart carefully and recognize, using context, where substitutions have occurred     Moisés Alfred Les Youngblood MD  07/07/23 1249

## 2023-07-07 NOTE — ED ATTENDING ATTESTATION
7/7/2023  I, [facility], saw and evaluated the patient. I have discussed the patient with the resident/non-physician practitioner and agree with the resident's/non-physician practitioner's findings, Plan of Care, and MDM as documented in the resident's/non-physician practitioner's note, except where noted. All available labs and Radiology studies were reviewed. I was present for key portions of any procedure(s) performed by the resident/non-physician practitioner and I was immediately available to provide assistance. At this point I agree with the current assessment done in the Emergency Department. I have conducted an independent evaluation of this patient a history and physical is as follows:    ED Course     60 y.o. M w/h/o PSVT p/w episode of SVT. [place], and noted his HR was in the 170s. Pt tried vagal maneuvers, ice to his forehead, and going into car with A/C on without relief. Takes 75mg metoprolol and took an extra 50mg during his episode. Pt was given 6mg adenosine and pt converted to NSR. States this happens about 4-5x year. Has no complaints currently. Plan: Labs, EKG.     Critical Care Time  Procedures

## 2023-07-07 NOTE — DISCHARGE INSTRUCTIONS
Follow-up with your cardiologist.    Return to the emergency department if symptoms worsen or if you have any other concerns.

## 2023-07-10 ENCOUNTER — OFFICE VISIT (OUTPATIENT)
Dept: UROLOGY | Facility: MEDICAL CENTER | Age: 63
End: 2023-07-10
Payer: COMMERCIAL

## 2023-07-10 VITALS
DIASTOLIC BLOOD PRESSURE: 80 MMHG | HEART RATE: 61 BPM | SYSTOLIC BLOOD PRESSURE: 140 MMHG | HEIGHT: 68 IN | WEIGHT: 213 LBS | BODY MASS INDEX: 32.28 KG/M2 | OXYGEN SATURATION: 98 %

## 2023-07-10 DIAGNOSIS — N20.0 NEPHROLITHIASIS: ICD-10-CM

## 2023-07-10 DIAGNOSIS — N40.2 PROSTATE NODULE: Primary | ICD-10-CM

## 2023-07-10 DIAGNOSIS — Z12.5 PROSTATE CANCER SCREENING: ICD-10-CM

## 2023-07-10 LAB
SL AMB  POCT GLUCOSE, UA: ABNORMAL
SL AMB LEUKOCYTE ESTERASE,UA: ABNORMAL
SL AMB POCT BILIRUBIN,UA: ABNORMAL
SL AMB POCT BLOOD,UA: ABNORMAL
SL AMB POCT CLARITY,UA: CLEAR
SL AMB POCT COLOR,UA: YELLOW
SL AMB POCT KETONES,UA: ABNORMAL
SL AMB POCT NITRITE,UA: ABNORMAL
SL AMB POCT PH,UA: 5.5
SL AMB POCT SPECIFIC GRAVITY,UA: 1.01
SL AMB POCT URINE PROTEIN: ABNORMAL
SL AMB POCT UROBILINOGEN: 0.2

## 2023-07-10 PROCEDURE — 99213 OFFICE O/P EST LOW 20 MIN: CPT | Performed by: UROLOGY

## 2023-07-10 PROCEDURE — 81003 URINALYSIS AUTO W/O SCOPE: CPT | Performed by: UROLOGY

## 2023-07-20 NOTE — PROGRESS NOTES
HISTORY:    Not seen in 3 years. 1.   moderate BPH, decent stream and control, not that much bothered. Nocturia x1-2. 2.  Prostate cancer screening. PSA 0.39 in June 2023,   0.3 in 2018   0.4 in 2017         ASSESSMENT / PLAN:    Doing well    Check PSA and prostate in 1 year    The following portions of the patient's history were reviewed and updated as appropriate: allergies, current medications, past family history, past medical history, past social history, past surgical history and problem list.    Review of Systems   All other systems reviewed and are negative.         Objective:     Physical Exam  Genitourinary:     Comments: Penis testes normal    Prostate minimally enlarged no nodules          0   Lab Value Date/Time    PSA 0.39 06/27/2023 1115    PSA 0.3 06/27/2018 1113    PSA 0.4 07/06/2017 0948    PSA 0.3 07/14/2015 1108   ]  BUN   Date Value Ref Range Status   07/07/2023 19 5 - 25 mg/dL Final   07/14/2015 16 10 - 26 mg/dL Final     Creatinine   Date Value Ref Range Status   07/07/2023 1.01 0.60 - 1.30 mg/dL Final     Comment:     Standardized to IDMS reference method   07/14/2015 1.00 0.60 - 1.30 mg/dL Final     Comment:     Standardized to IDMS reference method     No components found for: "CBC"      Patient Active Problem List   Diagnosis   • Benign prostatic hyperplasia without lower urinary tract symptoms   • CAD (coronary atherosclerotic disease)   • Calcium kidney stones   • Dyslipidemia   • Edema   • Erectile dysfunction   • Facet hypertrophy of lumbosacral region   • GERD (gastroesophageal reflux disease)   • Hyperglycemia   • Internal hemorrhoids   • Obesity   • Osteoarthritis cervical spine   • Paroxysmal SVT (supraventricular tachycardia) ([facility])   • Rhinitis   • Sacral radiculopathy   • Vitamin D deficiency   • Psoriasis   • Bleeding hemorrhoid   • Functional disorder of stomach   • Obstructive sleep apnea (adult) (pediatric)   • Daytime somnolence        Diagnoses and all orders for this visit:    Prostate nodule  -     POCT urine dip auto non-scope    Nephrolithiasis  -     POCT urine dip auto non-scope    Prostate cancer screening  -     PSA, total and free; Future           Patient ID: Marshal Strickland is a 60 y.o. male. Current Outpatient Medications:   •  aspirin 81 MG tablet, Take 1 tablet by mouth daily, Disp: , Rfl:   •  atorvastatin (LIPITOR) 40 mg tablet, Take by mouth, Disp: , Rfl:   •  clopidogrel (PLAVIX) 75 mg tablet, Take 1 tablet by mouth daily, Disp: , Rfl:   •  ezetimibe (ZETIA) 10 mg tablet, Take 1 tablet by mouth daily, Disp: , Rfl:   •  fexofenadine (ALLEGRA) 180 MG tablet, Take 1 tablet by mouth daily as needed (Allergies), Disp: , Rfl:   •  metoprolol succinate (TOPROL-XL) 50 mg 24 hr tablet, Take 75 mg by mouth daily, Disp: , Rfl: 0  •  omeprazole (PRILOSEC) 20 mg delayed release capsule, Take by mouth, Disp: , Rfl:   •  Vitamin D, Ergocalciferol, 2000 units CAPS, Take 2,000 Units by mouth daily, Disp: , Rfl:     Past Medical History:   Diagnosis Date   • Acute hepatitis A    • Acute prostatitis    • Coronary artery disease    • Dysphagia     last assessed - [id number]   • GERD (gastroesophageal reflux disease)    • History of radiation exposure to spine     histroy of exposure to radiation   • Plantar fasciitis        Past Surgical History:   Procedure Laterality Date   • COLONOSCOPY      Complete Colonoscopy - 10 yrs, onset 7/31/2009; 7/9/2014 - grade 4 int hemorrhoids, mild diverticulosis in mid sigmoid colon, onset [id number]   • CORONARY ANGIOPLASTY WITH STENT PLACEMENT     • DENTAL SURGERY     • ESOPHAGOGASTRODUODENOSCOPY  04/02/2015    Diagnostic EGD 4/2/15 - gastritis and duodenitis;  Sm hiatal hernia, gastric polyp, onset 27 Jun 2012   • PROSTATE BIOPSY  2015    needle biopsy       Social History

## 2023-09-20 ENCOUNTER — TELEPHONE (OUTPATIENT)
Dept: GASTROENTEROLOGY | Facility: CLINIC | Age: 63
End: 2023-09-20

## 2023-09-20 ENCOUNTER — OFFICE VISIT (OUTPATIENT)
Dept: GASTROENTEROLOGY | Facility: CLINIC | Age: 63
End: 2023-09-20
Payer: COMMERCIAL

## 2023-09-20 VITALS
BODY MASS INDEX: 35.32 KG/M2 | WEIGHT: 219.8 LBS | HEART RATE: 54 BPM | SYSTOLIC BLOOD PRESSURE: 148 MMHG | DIASTOLIC BLOOD PRESSURE: 77 MMHG | HEIGHT: 66 IN

## 2023-09-20 DIAGNOSIS — K22.70 BARRETT'S ESOPHAGUS WITHOUT DYSPLASIA: Primary | ICD-10-CM

## 2023-09-20 DIAGNOSIS — Z98.890 HISTORY OF CARDIAC RADIOFREQUENCY ABLATION: ICD-10-CM

## 2023-09-20 DIAGNOSIS — Z98.890 HISTORY OF COLONOSCOPY: ICD-10-CM

## 2023-09-20 DIAGNOSIS — K21.9 GASTROESOPHAGEAL REFLUX DISEASE WITHOUT ESOPHAGITIS: ICD-10-CM

## 2023-09-20 PROCEDURE — 99204 OFFICE O/P NEW MOD 45 MIN: CPT | Performed by: NURSE PRACTITIONER

## 2023-09-20 NOTE — H&P (VIEW-ONLY)
[facility] - Outpatient Consultation  Kalee Maria 60 y.o. male MRN: [id number]  Encounter: [id number]    ASSESSMENT AND PLAN:      1. Gastroesophageal reflux disease without esophagitis  Patient states he had been taking the omeprazole every other day however states he was having breakthrough acid reflux symptoms on days he was not taking the omeprazole. Discussed with patient currently prescribed omeprazole for King's esophagus as well as acid reflux symptoms.    -Discussed with patient taking the omeprazole 20 mg daily until further notice. - EGD scheduled at [facility]    2. 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  Patient's last EGD for King's surveillance was in [facility] in 2020 due to [place]. [place].    -Continue omeprazole 20 mg daily  - EGD scheduled at [facility]    3. History of cardiac radiofrequency ablation  Patient states he had a cardiac ablation on Monday 9/18. He is currently taking Plavix 75 mg daily. Will need his cardiologist approval in order to stop the Plavix in order to possibly do EGD. Patient does have an appointment on Monday 925 with his cardiologist.    4. History of colonoscopy  Patient[place] colon[place] in the [place]) [place] was 7/2014 however patient states he lives and works in [facility] in [address] [place] [address]. Noted diverticula, no polyps, anal fissure noted, ?10-year recall. Followup Appointment: 3 yrs or Per recommendation after EGD  ______________________________________________________________________    Chief Complaint   Patient presents with   • Shedule EGD     Hx. King's       HPI:   Kalee Maria is a 60y.o. year old male presents for follow-up King's surveillance EGD. Patient recently placed on Plavix s/p ablation at [facility] on 9/18. 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   Patient also had colonoscopy in [facility] in 2022 with noted diverticula, no polyps, noted anal fissure question 10-year follow-up. Patient has been taking omeprazole 20 mg every other day however states on days he does not take it he has breakthrough acid reflux symptoms. Discussed with patient continuing to take the omeprazole 20 mg on a daily basis until further noted. Patient is due to return back to [facility] shortly however he does have a follow-up appointment with his cardiologist at [facility] on Monday to see whether or not he can be released to travel. In that time, we will try to schedule him for an EGD at [facility]. Awaiting information if patient has to wait for extended period of time after procedure or can have it completed sooner. If not patient states he would prefer to have the EGD done when he comes home again for [place]. On exam, he denies nominal discomfort increased with palpation however patient did have bilateral groin punctures with ablation procedure on 9/18. Patient states his weight is stable. States he is having daily normal bowel movements. Denies hematochezia or melena. Historical Information   Past Medical History:   Diagnosis Date   • Acute hepatitis A    • Acute prostatitis    • Coronary artery disease    • Dysphagia     last assessed - 24Jun2013   • GERD (gastroesophageal reflux disease)    • History of radiation exposure to spine     histroy of exposure to radiation   • Plantar fasciitis      Past Surgical History:   Procedure Laterality Date   • CARDIAC ELECTROPHYSIOLOGY MAPPING AND ABLATION     • COLONOSCOPY      Complete Colonoscopy - 10 yrs, onset 7/31/2009; 7/9/2014 - grade 4 int hemorrhoids, mild diverticulosis in mid sigmoid colon, onset [id number]   • CORONARY ANGIOPLASTY WITH STENT PLACEMENT     • DENTAL SURGERY     • ESOPHAGOGASTRODUODENOSCOPY  04/02/2015    Diagnostic EGD 4/2/15 - gastritis and duodenitis;  Sm hiatal hernia, gastric polyp, onset 27 Jun 2012   • PROSTATE BIOPSY  2015    needle biopsy     Social History     Substance and Sexual Activity   Alcohol Use Not Currently    Comment: social; occasional alcohol use     Social History     Substance and Sexual Activity   Drug Use No     Social History     Tobacco Use   Smoking Status Never   Smokeless Tobacco Never     Family History   Problem Relation Age of Onset   • Hypertension Mother    • Breast cancer Mother    • Hyperlipidemia Mother    • Osteoporosis Mother    • Hypertension Father    • Coronary artery disease Paternal Grandfather    • Colon cancer Neg Hx    • Colon polyps Neg Hx        Meds/Allergies     Current Outpatient Medications:   •  aspirin 81 MG tablet  •  atorvastatin (LIPITOR) 40 mg tablet  •  clopidogrel (PLAVIX) 75 mg tablet  •  ezetimibe (ZETIA) 10 mg tablet  •  fexofenadine (ALLEGRA) 180 MG tablet  •  metoprolol succinate (TOPROL-XL) 50 mg 24 hr tablet  •  omeprazole (PRILOSEC) 20 mg delayed release capsule  •  Vitamin D, Ergocalciferol, 2000 units CAPS    Allergies   Allergen Reactions   • Ceftin [Cefuroxime] Other (See Comments)     Difficulty urinating        PHYSICAL EXAM:    Blood pressure 148/77, pulse (!) 54, height 5' 6" (1.676 m), weight 99.7 kg (219 lb 12.8 oz). Body mass index is 35.48 kg/m². General Appearance: NAD, cooperative, alert  Eyes: Anicteric, PERRLA, EOMI  ENT:  Normocephalic, atraumatic, normal mucosa. Neck:  Supple, symmetrical, trachea midline,   Resp:  Clear to auscultation bilaterally; no rales, rhonchi or wheezing; respirations unlabored   CV:  S1 S2, Regular rate and rhythm; no murmur, rub, or gallop. GI:  Soft, lower abdominal tenderness, likely increased with patient's recent bilateral groin ablation seizure, non-tender, non-distended; normal bowel sounds; no masses, no organomegaly   Rectal: Deferred  Musculoskeletal: No cyanosis, clubbing or edema. Normal ROM.   Skin:  No jaundice, rashes, or lesions   Heme/Lymph: No palpable cervical lymphadenopathy  Psych: Normal affect, good eye contact  Neuro: No gross deficits, AAOx3    Lab Results:   Lab Results   Component Value Date    WBC 5.83 07/07/2023    HGB 15.6 07/07/2023    HCT 46.9 07/07/2023    MCV 92 07/07/2023     07/07/2023     Lab Results   Component Value Date     07/14/2015    K 3.9 07/07/2023     07/07/2023    CO2 24 07/07/2023    ANIONGAP 8 07/14/2015    BUN 19 07/07/2023    CREATININE 1.01 07/07/2023    GLUCOSE 97 07/14/2015    GLUF 104 (H) 06/27/2023    CALCIUM 8.9 07/07/2023    AST 23 08/04/2018    ALT 50 08/04/2018    ALKPHOS 87 08/04/2018    PROT 7.2 07/14/2015    BILITOT 0.9 07/14/2015    EGFR 78 07/07/2023     No results found for: "IRON", "TIBC", "FERRITIN"  No results found for: "LIPASE"    Radiology Results:   No results found. REVIEW OF SYSTEMS:    CONSTITUTIONAL: Denies any fever, chills, rigors, and weight loss. HEENT: No earache or tinnitus. Denies hearing loss or visual disturbances. CARDIOVASCULAR: No chest pain or palpitations. RESPIRATORY: Denies any cough, hemoptysis, shortness of breath or dyspnea on exertion. GASTROINTESTINAL: As noted in the History of Present Illness. GENITOURINARY: No problems with urination. Denies any hematuria or dysuria. NEUROLOGIC: No dizziness or vertigo, denies headaches. MUSCULOSKELETAL: Denies any muscle or joint pain. SKIN: Denies skin rashes or itching. ENDOCRINE: Denies excessive thirst. Denies intolerance to heat or cold. PSYCHOSOCIAL: Denies depression or anxiety. Denies any recent memory loss.

## 2023-09-20 NOTE — TELEPHONE ENCOUNTER
Clearance form faxed to Dr. Nadira Butcher at the [facility] [address] [place] [address]

## 2023-09-20 NOTE — PATIENT INSTRUCTIONS
Dr. Tana Rodriguez is the esophageal specialist at [address]. Possible future appointment with him.

## 2023-09-20 NOTE — PROGRESS NOTES
[address] [place] [address] [facility] - Outpatient Consultation  Justo Gaona 60 y.o. male MRN: [id number]  Encounter: [id number]    ASSESSMENT AND PLAN:      1. Gastroesophageal reflux disease without esophagitis  Patient states he had been taking the omeprazole every other day however states he was having breakthrough acid reflux symptoms on days he was not taking the omeprazole. Discussed with patient currently prescribed omeprazole for King's esophagus as well as acid reflux symptoms.    -Discussed with patient taking the omeprazole 20 mg daily until further notice. - EGD scheduled at [facility]    2. 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  Patient's last EGD for King's surveillance was in [facility] in 2020 due to [place]. His last [place] the [place].    -Continue omeprazole 20 mg daily  - EGD scheduled at [facility]    3. History of cardiac radiofrequency ablation  Patient states he had a cardiac ablation on Monday 9/18. He is currently taking Plavix 75 mg daily. Will need his cardiologist approval in order to stop the Plavix in order to possibly do EGD. Patient does have an appointment on Monday 925 with his cardiologist.    4. History of colonoscopy  Patient[place] was 7/2014 however patient states he lives and works in [facility] last colonoscopy in [address] [place] [address]. Noted diverticula, no polyps, anal fissure noted, ?10-year recall. Followup Appointment: 3 yrs or Per recommendation after EGD  ______________________________________________________________________    Chief Complaint   Patient presents with   • Shedule EGD     Hx. King's       HPI:   Justo Gaona is a 60y.o. year old male presents for follow-up King's surveillance EGD. Patient recently placed on Plavix s/p ablation at [facility] on 9/18. 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   Patient also had colonoscopy in [facility] in 2022 with noted diverticula, no polyps, noted anal fissure question 10-year follow-up. Patient has been taking omeprazole 20 mg every other day however states on days he does not take it he has breakthrough acid reflux symptoms. Discussed with patient continuing to take the omeprazole 20 mg on a daily basis until further noted. Patient is due to return back to [facility] shortly however he does have a follow-up appointment with his cardiologist at [facility] on Monday to see whether or not he can be released to travel. In that time, we will try to schedule him for an EGD at [facility]. Awaiting information if patient has to wait for extended period of time after procedure or can have it completed sooner. If not patient states he would prefer to have the EGD done when he comes home again for [place]. On exam, he denies nominal discomfort increased with palpation however patient did have bilateral groin punctures with ablation procedure on 9/18. Patient states his weight is stable. States he is having daily normal bowel movements. Denies hematochezia or melena. Historical Information   Past Medical History:   Diagnosis Date   • Acute hepatitis A    • Acute prostatitis    • Coronary artery disease    • Dysphagia     last assessed - 24Jun2013   • GERD (gastroesophageal reflux disease)    • History of radiation exposure to spine     histroy of exposure to radiation   • Plantar fasciitis      Past Surgical History:   Procedure Laterality Date   • CARDIAC ELECTROPHYSIOLOGY MAPPING AND ABLATION     • COLONOSCOPY      Complete Colonoscopy - 10 yrs, onset 7/31/2009; 7/9/2014 - grade 4 int hemorrhoids, mild diverticulosis in mid sigmoid colon, onset [id number]   • CORONARY ANGIOPLASTY WITH STENT PLACEMENT     • DENTAL SURGERY     • ESOPHAGOGASTRODUODENOSCOPY  04/02/2015    Diagnostic EGD 4/2/15 - gastritis and duodenitis;  Sm hiatal hernia, gastric polyp, onset 27 Jun 2012   • PROSTATE BIOPSY  2015    needle biopsy     Social History     Substance and Sexual Activity   Alcohol Use Not Currently    Comment: social; occasional alcohol use     Social History     Substance and Sexual Activity   Drug Use No     Social History     Tobacco Use   Smoking Status Never   Smokeless Tobacco Never     Family History   Problem Relation Age of Onset   • Hypertension Mother    • Breast cancer Mother    • Hyperlipidemia Mother    • Osteoporosis Mother    • Hypertension Father    • Coronary artery disease Paternal Grandfather    • Colon cancer Neg Hx    • Colon polyps Neg Hx        Meds/Allergies     Current Outpatient Medications:   •  aspirin 81 MG tablet  •  atorvastatin (LIPITOR) 40 mg tablet  •  clopidogrel (PLAVIX) 75 mg tablet  •  ezetimibe (ZETIA) 10 mg tablet  •  fexofenadine (ALLEGRA) 180 MG tablet  •  metoprolol succinate (TOPROL-XL) 50 mg 24 hr tablet  •  omeprazole (PRILOSEC) 20 mg delayed release capsule  •  Vitamin D, Ergocalciferol, 2000 units CAPS    Allergies   Allergen Reactions   • Ceftin [Cefuroxime] Other (See Comments)     Difficulty urinating        PHYSICAL EXAM:    Blood pressure 148/77, pulse (!) 54, height 5' 6" (1.676 m), weight 99.7 kg (219 lb 12.8 oz). Body mass index is 35.48 kg/m². General Appearance: NAD, cooperative, alert  Eyes: Anicteric, PERRLA, EOMI  ENT:  Normocephalic, atraumatic, normal mucosa. Neck:  Supple, symmetrical, trachea midline,   Resp:  Clear to auscultation bilaterally; no rales, rhonchi or wheezing; respirations unlabored   CV:  S1 S2, Regular rate and rhythm; no murmur, rub, or gallop. GI:  Soft, lower abdominal tenderness, likely increased with patient's recent bilateral groin ablation seizure, non-tender, non-distended; normal bowel sounds; no masses, no organomegaly   Rectal: Deferred  Musculoskeletal: No cyanosis, clubbing or edema. Normal ROM.   Skin:  No jaundice, rashes, or lesions   Heme/Lymph: No palpable cervical lymphadenopathy  Psych: Normal affect, good eye contact  Neuro: No gross deficits, AAOx3    Lab Results:   Lab Results   Component Value Date    WBC 5.83 07/07/2023    HGB 15.6 07/07/2023    HCT 46.9 07/07/2023    MCV 92 07/07/2023     07/07/2023     Lab Results   Component Value Date     07/14/2015    K 3.9 07/07/2023     07/07/2023    CO2 24 07/07/2023    ANIONGAP 8 07/14/2015    BUN 19 07/07/2023    CREATININE 1.01 07/07/2023    GLUCOSE 97 07/14/2015    GLUF 104 (H) 06/27/2023    CALCIUM 8.9 07/07/2023    AST 23 08/04/2018    ALT 50 08/04/2018    ALKPHOS 87 08/04/2018    PROT 7.2 07/14/2015    BILITOT 0.9 07/14/2015    EGFR 78 07/07/2023     No results found for: "IRON", "TIBC", "FERRITIN"  No results found for: "LIPASE"    Radiology Results:   No results found. REVIEW OF SYSTEMS:    CONSTITUTIONAL: Denies any fever, chills, rigors, and weight loss. HEENT: No earache or tinnitus. Denies hearing loss or visual disturbances. CARDIOVASCULAR: No chest pain or palpitations. RESPIRATORY: Denies any cough, hemoptysis, shortness of breath or dyspnea on exertion. GASTROINTESTINAL: As noted in the History of Present Illness. GENITOURINARY: No problems with urination. Denies any hematuria or dysuria. NEUROLOGIC: No dizziness or vertigo, denies headaches. MUSCULOSKELETAL: Denies any muscle or joint pain. SKIN: Denies skin rashes or itching. ENDOCRINE: Denies excessive thirst. Denies intolerance to heat or cold. PSYCHOSOCIAL: Denies depression or anxiety. Denies any recent memory loss.

## 2023-09-20 NOTE — TELEPHONE ENCOUNTER
Scheduled date of EGD(as of today): 10/2  Physician performing EGD: Dr. Chasity Carlos  Location of EGD: [facility]  Instructions reviewed with patient by: Trinidad Hargrove  Clearances: Plavix    **patient does not know time because we had to re-arrange the schedule on [facility].

## 2023-09-25 NOTE — TELEPHONE ENCOUNTER
Dr. Mallorie Hardwick - please see note from Dr. Jose Cruz Allen on visit with Federico. He feels patient could remain on Plavix for EGD. Please advise.

## 2023-09-26 ENCOUNTER — TELEPHONE (OUTPATIENT)
Dept: GASTROENTEROLOGY | Facility: CLINIC | Age: 63
End: 2023-09-26

## 2023-09-26 NOTE — TELEPHONE ENCOUNTER
Procedure confirmed  Endoscopy     Via: Spoke with patient. Instructions given: Given to Patient at Visit     Prep Given: N/A    Call the office if there are any questions. Confirmed Plavix hold per [facility]. Last dose 9/26/2023.

## 2023-10-02 ENCOUNTER — ANESTHESIA (OUTPATIENT)
Dept: GASTROENTEROLOGY | Facility: AMBULATORY SURGERY CENTER | Age: 63
End: 2023-10-02

## 2023-10-02 ENCOUNTER — HOSPITAL ENCOUNTER (OUTPATIENT)
Dept: GASTROENTEROLOGY | Facility: AMBULATORY SURGERY CENTER | Age: 63
Discharge: HOME | End: 2023-10-02
Payer: COMMERCIAL

## 2023-10-02 ENCOUNTER — ANESTHESIA EVENT (OUTPATIENT)
Dept: GASTROENTEROLOGY | Facility: AMBULATORY SURGERY CENTER | Age: 63
End: 2023-10-02

## 2023-10-02 VITALS
HEART RATE: 51 BPM | WEIGHT: 219 LBS | BODY MASS INDEX: 35.2 KG/M2 | HEIGHT: 66 IN | SYSTOLIC BLOOD PRESSURE: 144 MMHG | RESPIRATION RATE: 34 BRPM | TEMPERATURE: 97.8 F | OXYGEN SATURATION: 97 % | DIASTOLIC BLOOD PRESSURE: 77 MMHG

## 2023-10-02 DIAGNOSIS — K21.9 GASTROESOPHAGEAL REFLUX DISEASE WITHOUT ESOPHAGITIS: ICD-10-CM

## 2023-10-02 DIAGNOSIS — K22.70 BARRETT'S ESOPHAGUS WITHOUT DYSPLASIA: ICD-10-CM

## 2023-10-02 PROCEDURE — 43239 EGD BIOPSY SINGLE/MULTIPLE: CPT | Performed by: STUDENT IN AN ORGANIZED HEALTH CARE EDUCATION/TRAINING PROGRAM

## 2023-10-02 PROCEDURE — 88305 TISSUE EXAM BY PATHOLOGIST: CPT | Performed by: STUDENT IN AN ORGANIZED HEALTH CARE EDUCATION/TRAINING PROGRAM

## 2023-10-02 RX ORDER — PROPOFOL 10 MG/ML
INJECTION, EMULSION INTRAVENOUS AS NEEDED
Status: DISCONTINUED | OUTPATIENT
Start: 2023-10-02 | End: 2023-10-02

## 2023-10-02 RX ORDER — LIDOCAINE HYDROCHLORIDE 20 MG/ML
INJECTION, SOLUTION EPIDURAL; INFILTRATION; INTRACAUDAL; PERINEURAL AS NEEDED
Status: DISCONTINUED | OUTPATIENT
Start: 2023-10-02 | End: 2023-10-02

## 2023-10-02 RX ORDER — SODIUM CHLORIDE, SODIUM LACTATE, POTASSIUM CHLORIDE, CALCIUM CHLORIDE 600; 310; 30; 20 MG/100ML; MG/100ML; MG/100ML; MG/100ML
50 INJECTION, SOLUTION INTRAVENOUS CONTINUOUS
Status: DISCONTINUED | OUTPATIENT
Start: 2023-10-02 | End: 2023-10-06 | Stop reason: HOSPADM

## 2023-10-02 RX ADMIN — LIDOCAINE HYDROCHLORIDE 100 MG: 20 INJECTION, SOLUTION EPIDURAL; INFILTRATION; INTRACAUDAL; PERINEURAL at 09:26

## 2023-10-02 RX ADMIN — SODIUM CHLORIDE, SODIUM LACTATE, POTASSIUM CHLORIDE, CALCIUM CHLORIDE 50 ML/HR: 600; 310; 30; 20 INJECTION, SOLUTION INTRAVENOUS at 09:10

## 2023-10-02 RX ADMIN — PROPOFOL 50 MG: 10 INJECTION, EMULSION INTRAVENOUS at 09:29

## 2023-10-02 RX ADMIN — PROPOFOL 150 MG: 10 INJECTION, EMULSION INTRAVENOUS at 09:26

## 2023-10-02 NOTE — INTERVAL H&P NOTE
H&P reviewed. After examining the patient I find no changes in the patients condition since the H&P had been written.     Vitals:    10/02/23 0904   BP: 147/82   Pulse: (!) 51   Resp: 16   Temp: 97.8 °F (36.6 °C)   SpO2: 97%

## 2023-10-02 NOTE — ANESTHESIA POSTPROCEDURE EVALUATION
Post-Op Assessment Note    CV Status:  Stable    Pain management: adequate     Mental Status:  Awake and sleepy   Hydration Status:  Euvolemic   PONV Controlled:  Controlled   Airway Patency:  Patent      Post Op Vitals Reviewed: Yes      Staff: CRNA         There were no known notable events for this encounter.     /60 (10/02/23 0938)    Temp      Pulse (!) 53 (10/02/23 0938)   Resp 13 (10/02/23 0938)    SpO2 92 % (10/02/23 4845)

## 2023-10-02 NOTE — ANESTHESIA PREPROCEDURE EVALUATION
Procedure:  EGD    Relevant Problems   ANESTHESIA (within normal limits)      CARDIO   (+) CAD (coronary atherosclerotic disease) (s/p MARIA G to LAD 2006, no issues since, cath 2020 was patent)   (+) Paroxysmal SVT (supraventricular tachycardia) (s/p ablation 3 weeks ago, no issues  seen by cardiology last week, doing well, no restrictions)      GI/HEPATIC   (+) GERD (gastroesophageal reflux disease)      /RENAL   (+) Benign prostatic hyperplasia without lower urinary tract symptoms      PULMONARY   (+) Obstructive sleep apnea (adult) (pediatric) (using CPAP)      Digestive   (+) King's esophagus (Resolved)      Musculoskeletal and Integument   (+) Psoriasis    BMI 35    Physical Exam    Airway  Comment: Large neck/tongue  Mallampati score: III  TM Distance: >3 FB  Neck ROM: full     Dental   No notable dental hx     Cardiovascular      Pulmonary      Other Findings        Anesthesia Plan  ASA Score- 3     Anesthesia Type- IV sedation with anesthesia with ASA Monitors. Additional Monitors:   Airway Plan:     Comment: Last week another anesthesiologist wanted to postpone procedure for cardiac follow up after SVT ablation. Pt saw cardiologist last week however, no restrictions or issues since ablation, has held plavix for procedure today, I see no contraindication to proceed. .       Plan Factors-Exercise tolerance (METS): >4 METS. Chart reviewed. Existing labs reviewed. Patient summary reviewed. Patient is not a current smoker. Obstructive sleep apnea risk education given perioperatively. Induction- intravenous. Postoperative Plan-     Informed Consent- Anesthetic plan and risks discussed with patient. I personally reviewed this patient with the CRNA. Discussed and agreed on the Anesthesia Plan with the CRNA. Elliott Stewart

## 2023-10-05 PROCEDURE — 88305 TISSUE EXAM BY PATHOLOGIST: CPT | Performed by: STUDENT IN AN ORGANIZED HEALTH CARE EDUCATION/TRAINING PROGRAM

## 2023-12-18 ENCOUNTER — CLINICAL SUPPORT (OUTPATIENT)
Dept: FAMILY MEDICINE CLINIC | Facility: HOSPITAL | Age: 63
End: 2023-12-18
Payer: COMMERCIAL

## 2023-12-18 DIAGNOSIS — Z23 ENCOUNTER FOR IMMUNIZATION: Primary | ICD-10-CM

## 2023-12-18 PROCEDURE — 90715 TDAP VACCINE 7 YRS/> IM: CPT

## 2023-12-18 PROCEDURE — 90471 IMMUNIZATION ADMIN: CPT | Performed by: INTERNAL MEDICINE

## 2023-12-18 PROCEDURE — 90472 IMMUNIZATION ADMIN EACH ADD: CPT

## 2023-12-18 PROCEDURE — 90677 PCV20 VACCINE IM: CPT | Performed by: INTERNAL MEDICINE

## 2024-06-06 ENCOUNTER — TELEPHONE (OUTPATIENT)
Age: 64
End: 2024-06-06

## 2024-06-06 NOTE — TELEPHONE ENCOUNTER
Pt called to schedule his 1 year f/u appt.  Pt lives in [facility] [place].  Scheduled pt with Dr. Alvarado 10/22/24, next avail.  Also placed on wait list.  Checked multiple office locations as per pt's request.  Pt sts that [place] the [place] the end of June, through the end of July.  Please review for appropriate scheduling.    Pt call back: [phone]

## 2024-06-06 NOTE — TELEPHONE ENCOUNTER
Attempted to contact patient at number provided. Message received stating that number is no longer in service. No alternative number provided to call.

## 2024-06-28 ENCOUNTER — TELEPHONE (OUTPATIENT)
Dept: FAMILY MEDICINE CLINIC | Facility: HOSPITAL | Age: 64
End: 2024-06-28

## 2024-06-28 DIAGNOSIS — R73.9 HYPERGLYCEMIA: ICD-10-CM

## 2024-06-28 DIAGNOSIS — Z12.5 PROSTATE CANCER SCREENING: ICD-10-CM

## 2024-06-28 DIAGNOSIS — I25.10 ATHEROSCLEROSIS OF NATIVE CORONARY ARTERY OF NATIVE HEART WITHOUT ANGINA PECTORIS: ICD-10-CM

## 2024-06-28 DIAGNOSIS — I47.10 PAROXYSMAL SVT (SUPRAVENTRICULAR TACHYCARDIA): ICD-10-CM

## 2024-06-28 DIAGNOSIS — E78.5 DYSLIPIDEMIA: Primary | ICD-10-CM

## 2024-06-28 NOTE — TELEPHONE ENCOUNTER
Patient scheduled PE for 12/30 [place].    Asking for labs orders to be done prior to appt?  Also, asking if you want the labs done now or closer to the appt date?    Patient does not currently have a phone, so he will stop in one day next week for your response.    Pls DO NOT call patient at number on file.  He will stop back in the office.

## 2024-07-01 ENCOUNTER — APPOINTMENT (OUTPATIENT)
Dept: LAB | Facility: HOSPITAL | Age: 64
End: 2024-07-01
Payer: COMMERCIAL

## 2024-07-01 DIAGNOSIS — Z12.5 PROSTATE CANCER SCREENING: ICD-10-CM

## 2024-07-01 LAB
PSA FREE MFR SERPL: 44.12 %
PSA FREE SERPL-MCNC: 0.18 NG/ML
PSA SERPL-MCNC: 0.42 NG/ML (ref 0–4)

## 2024-07-01 PROCEDURE — 36415 COLL VENOUS BLD VENIPUNCTURE: CPT

## 2024-07-01 PROCEDURE — 84154 ASSAY OF PSA FREE: CPT

## 2024-07-01 PROCEDURE — 84153 ASSAY OF PSA TOTAL: CPT

## 2024-07-02 ENCOUNTER — APPOINTMENT (OUTPATIENT)
Dept: LAB | Facility: HOSPITAL | Age: 64
End: 2024-07-02
Payer: COMMERCIAL

## 2024-07-02 LAB
BASOPHILS # BLD AUTO: 0.04 THOUSANDS/ÂΜL (ref 0–0.1)
BASOPHILS NFR BLD AUTO: 1 % (ref 0–1)
EOSINOPHIL # BLD AUTO: 0.54 THOUSAND/ÂΜL (ref 0–0.61)
EOSINOPHIL NFR BLD AUTO: 8 % (ref 0–6)
ERYTHROCYTE [DISTWIDTH] IN BLOOD BY AUTOMATED COUNT: 13.2 % (ref 11.6–15.1)
EST. AVERAGE GLUCOSE BLD GHB EST-MCNC: 123 MG/DL
HBA1C MFR BLD: 5.9 %
HCT VFR BLD AUTO: 46 % (ref 36.5–49.3)
HGB BLD-MCNC: 15.9 G/DL (ref 12–17)
IMM GRANULOCYTES # BLD AUTO: 0.01 THOUSAND/UL (ref 0–0.2)
IMM GRANULOCYTES NFR BLD AUTO: 0 % (ref 0–2)
LYMPHOCYTES # BLD AUTO: 1.64 THOUSANDS/ÂΜL (ref 0.6–4.47)
LYMPHOCYTES NFR BLD AUTO: 25 % (ref 14–44)
MCH RBC QN AUTO: 31.5 PG (ref 26.8–34.3)
MCHC RBC AUTO-ENTMCNC: 34.6 G/DL (ref 31.4–37.4)
MCV RBC AUTO: 91 FL (ref 82–98)
MONOCYTES # BLD AUTO: 0.55 THOUSAND/ÂΜL (ref 0.17–1.22)
MONOCYTES NFR BLD AUTO: 9 % (ref 4–12)
NEUTROPHILS # BLD AUTO: 3.68 THOUSANDS/ÂΜL (ref 1.85–7.62)
NEUTS SEG NFR BLD AUTO: 57 % (ref 43–75)
NRBC BLD AUTO-RTO: 0 /100 WBCS
PLATELET # BLD AUTO: 180 THOUSANDS/UL (ref 149–390)
PMV BLD AUTO: 10.8 FL (ref 8.9–12.7)
PSA SERPL-MCNC: 0.56 NG/ML (ref 0–4)
RBC # BLD AUTO: 5.05 MILLION/UL (ref 3.88–5.62)
TSH SERPL DL<=0.05 MIU/L-ACNC: 2.24 UIU/ML (ref 0.45–4.5)
WBC # BLD AUTO: 6.46 THOUSAND/UL (ref 4.31–10.16)

## 2024-07-02 PROCEDURE — 83036 HEMOGLOBIN GLYCOSYLATED A1C: CPT | Performed by: INTERNAL MEDICINE

## 2024-07-02 PROCEDURE — 80053 COMPREHEN METABOLIC PANEL: CPT | Performed by: INTERNAL MEDICINE

## 2024-07-02 PROCEDURE — 85025 COMPLETE CBC W/AUTO DIFF WBC: CPT | Performed by: INTERNAL MEDICINE

## 2024-07-02 PROCEDURE — 84443 ASSAY THYROID STIM HORMONE: CPT | Performed by: INTERNAL MEDICINE

## 2024-07-02 PROCEDURE — 36415 COLL VENOUS BLD VENIPUNCTURE: CPT | Performed by: INTERNAL MEDICINE

## 2024-07-02 PROCEDURE — G0103 PSA SCREENING: HCPCS | Performed by: INTERNAL MEDICINE

## 2024-07-02 PROCEDURE — 80061 LIPID PANEL: CPT | Performed by: INTERNAL MEDICINE

## 2024-07-02 RX ORDER — NITROGLYCERIN 0.4 MG/1
0.4 TABLET SUBLINGUAL
COMMUNITY
Start: 2024-06-28

## 2024-07-03 ENCOUNTER — OFFICE VISIT (OUTPATIENT)
Dept: OBGYN CLINIC | Facility: CLINIC | Age: 64
End: 2024-07-03
Payer: COMMERCIAL

## 2024-07-03 ENCOUNTER — APPOINTMENT (OUTPATIENT)
Dept: RADIOLOGY | Facility: CLINIC | Age: 64
End: 2024-07-03
Payer: COMMERCIAL

## 2024-07-03 VITALS
DIASTOLIC BLOOD PRESSURE: 82 MMHG | SYSTOLIC BLOOD PRESSURE: 138 MMHG | BODY MASS INDEX: 35.52 KG/M2 | WEIGHT: 221 LBS | HEIGHT: 66 IN

## 2024-07-03 DIAGNOSIS — M79.645 THUMB PAIN, LEFT: ICD-10-CM

## 2024-07-03 DIAGNOSIS — M65.312 TRIGGER THUMB OF LEFT HAND: Primary | ICD-10-CM

## 2024-07-03 LAB
ALBUMIN SERPL BCG-MCNC: 4.1 G/DL (ref 3.5–5)
ALP SERPL-CCNC: 57 U/L (ref 34–104)
ALT SERPL W P-5'-P-CCNC: 26 U/L (ref 7–52)
ANION GAP SERPL CALCULATED.3IONS-SCNC: 11 MMOL/L (ref 4–13)
AST SERPL W P-5'-P-CCNC: 20 U/L (ref 13–39)
BILIRUB SERPL-MCNC: 0.92 MG/DL (ref 0.2–1)
BUN SERPL-MCNC: 17 MG/DL (ref 5–25)
CALCIUM SERPL-MCNC: 8.9 MG/DL (ref 8.4–10.2)
CHLORIDE SERPL-SCNC: 104 MMOL/L (ref 96–108)
CHOLEST SERPL-MCNC: 85 MG/DL
CO2 SERPL-SCNC: 25 MMOL/L (ref 21–32)
CREAT SERPL-MCNC: 0.98 MG/DL (ref 0.6–1.3)
GFR SERPL CREATININE-BSD FRML MDRD: 81 ML/MIN/1.73SQ M
GLUCOSE P FAST SERPL-MCNC: 101 MG/DL (ref 65–99)
HDLC SERPL-MCNC: 35 MG/DL
LDLC SERPL CALC-MCNC: 39 MG/DL (ref 0–100)
NONHDLC SERPL-MCNC: 50 MG/DL
POTASSIUM SERPL-SCNC: 4.2 MMOL/L (ref 3.5–5.3)
PROT SERPL-MCNC: 6.4 G/DL (ref 6.4–8.4)
SODIUM SERPL-SCNC: 140 MMOL/L (ref 135–147)
TRIGL SERPL-MCNC: 53 MG/DL

## 2024-07-03 PROCEDURE — 99203 OFFICE O/P NEW LOW 30 MIN: CPT | Performed by: ORTHOPAEDIC SURGERY

## 2024-07-03 PROCEDURE — 73140 X-RAY EXAM OF FINGER(S): CPT

## 2024-07-03 PROCEDURE — 20550 NJX 1 TENDON SHEATH/LIGAMENT: CPT | Performed by: ORTHOPAEDIC SURGERY

## 2024-07-03 RX ORDER — LIDOCAINE HYDROCHLORIDE 10 MG/ML
0.3 INJECTION, SOLUTION INFILTRATION; PERINEURAL
Status: COMPLETED | OUTPATIENT
Start: 2024-07-03 | End: 2024-07-03

## 2024-07-03 RX ORDER — BETAMETHASONE SODIUM PHOSPHATE AND BETAMETHASONE ACETATE 3; 3 MG/ML; MG/ML
1.2 INJECTION, SUSPENSION INTRA-ARTICULAR; INTRALESIONAL; INTRAMUSCULAR; SOFT TISSUE
Status: COMPLETED | OUTPATIENT
Start: 2024-07-03 | End: 2024-07-03

## 2024-07-03 RX ADMIN — LIDOCAINE HYDROCHLORIDE 0.3 ML: 10 INJECTION, SOLUTION INFILTRATION; PERINEURAL at 10:00

## 2024-07-03 RX ADMIN — BETAMETHASONE SODIUM PHOSPHATE AND BETAMETHASONE ACETATE 1.2 MG: 3; 3 INJECTION, SUSPENSION INTRA-ARTICULAR; INTRALESIONAL; INTRAMUSCULAR; SOFT TISSUE at 10:00

## 2024-07-03 NOTE — ASSESSMENT & PLAN NOTE
Examination and review of the x-rays of his left thumb demonstrates symptoms consistent with a trigger thumb.  He does have palpable clicking on exam and a palpable nodule at the A1 pulley of the flexor tendon at the thumb.  I did note that he could consider nonoperative care with a steroid injection at the A1 pulley.  He verbalized understanding and consent for a steroid injection for a trigger thumb of the left side.  He tolerated the injection well without complication.  Postinjection instructions were provided.  He can receive a second injection if the first injection fails to provide him with lasting relief.  [place] can [place]up when he is back in December and at that time if his symptoms persist he can consider repeat steroid injection.  If injections fail to provide him with lasting relief a trigger thumb release procedure would be considered as well.  He verbalized understanding had no further questions.  I will see him back on an as-needed basis.  All patient's questions were answered to his satisfaction.  This note is created using dictation transcription.  It may contain typographical errors, grammatical errors, improperly dictated words, background noise and other errors.

## 2024-07-03 NOTE — PROGRESS NOTES
Assessment:     1. Trigger thumb of left hand    2. Thumb pain, left        Plan:     Problem List Items Addressed This Visit          Musculoskeletal and Integument    Trigger thumb of left hand - Primary     Examination and review of the x-rays of his left thumb demonstrates symptoms consistent with a trigger thumb.  He does have palpable clicking on exam and a palpable nodule at the A1 pulley of the flexor tendon at the thumb.  I did note that he could consider nonoperative care with a steroid injection at the A1 pulley.  He verbalized understanding and consent for a steroid injection for a trigger thumb of the left side.  He tolerated the injection well without complication.  Postinjection instructions were provided.  He can receive a second injection if the first injection fails to provide him with lasting relief.  [place] he can follow-up when he is back in December and at that time if his symptoms persist he can consider repeat steroid injection.  If injections fail to provide him with lasting relief a trigger thumb release procedure would be considered as well.  He verbalized understanding had no further questions.  I will see him back on an as-needed basis.  All patient's questions were answered to his satisfaction.  This note is created using dictation transcription.  It may contain typographical errors, grammatical errors, improperly dictated words, background noise and other errors.         Relevant Medications    lidocaine (XYLOCAINE) 1 % injection 0.3 mL (Completed)    betamethasone acetate-betamethasone sodium phosphate (CELESTONE) injection 1.2 mg (Completed)    Other Relevant Orders    Hand/upper extremity injection: L thumb A1 (Completed)     Other Visit Diagnoses       Thumb pain, left        Relevant Orders    XR thumb left first digit-min 2v           Subjective:     Patient ID: Wally Croft is a 63 y.o. male.  Chief Complaint:  Wally is a pleasant right-hand-dominant  63-year-old male presenting for initial evaluation of his left thumb.  He has been experiencing symptoms into the thumb over the past 4 to 6 weeks.  He denies a trauma that has resulted in his symptoms.  He states that he was going through a period of time where sleeping with his hand behind his head was comfortable as he does utilize a CPAP.  He localizes pain to the volar and base of the thumb and states he is experiencing intermittent locking and clicking of the thumb.  He denies any surgical history of his thumb.  He denies any history of trigger fingers of any other finger.  He also denies a history of diabetes.  Today he denies any distal paresthesias.    Allergy:  Allergies   Allergen Reactions    Ceftin [Cefuroxime] Other (See Comments)     Difficulty urinating      Medications:  all current active meds have been reviewed  Past Medical History:  Past Medical History:   Diagnosis Date    Acute hepatitis A     Acute prostatitis     Angina pectoris (HCC)     Coronary artery disease     CPAP (continuous positive airway pressure) dependence     Dysphagia     last assessed - [id number]    GERD (gastroesophageal reflux disease)     History of radiation exposure to spine     histroy of exposure to radiation    Plantar fasciitis     Sleep apnea     SVT (supraventricular tachycardia)      Past Surgical History:  Past Surgical History:   Procedure Laterality Date    CARDIAC ELECTROPHYSIOLOGY MAPPING AND ABLATION      for svt    COLONOSCOPY      Complete Colonoscopy - 10 yrs, onset 7/31/2009; 7/9/2014 - grade 4 int hemorrhoids, mild diverticulosis in mid sigmoid colon, onset [id number]    CORONARY ANGIOPLASTY WITH STENT PLACEMENT      DENTAL SURGERY      ESOPHAGOGASTRODUODENOSCOPY  04/02/2015    Diagnostic EGD 4/2/15 - gastritis and duodenitis; Sm hiatal hernia, gastric polyp, onset 27 Jun 2012    PROSTATE BIOPSY  2015    needle biopsy    TONSILLECTOMY       Family History:  Family History   Problem Relation Age of Onset  "   Hypertension Mother     Breast cancer Mother     Hyperlipidemia Mother     Osteoporosis Mother     Hypertension Father     Coronary artery disease Paternal Grandfather     Colon cancer Neg Hx     Colon polyps Neg Hx      Social History:  Social History     Substance and Sexual Activity   Alcohol Use Not Currently    Comment: social; occasional alcohol use     Social History     Substance and Sexual Activity   Drug Use No     Social History     Tobacco Use   Smoking Status Never   Smokeless Tobacco Never     Review of Systems   Constitutional:  Negative for chills, fever and unexpected weight change.   HENT:  Negative for hearing loss, nosebleeds and sore throat.    Eyes:  Negative for pain, redness and visual disturbance.   Respiratory:  Negative for cough, shortness of breath and wheezing.    Cardiovascular:  Negative for chest pain, palpitations and leg swelling.   Gastrointestinal:  Negative for abdominal pain, nausea and vomiting.   Endocrine: Negative for polyphagia and polyuria.   Genitourinary:  Negative for dysuria and hematuria.   Musculoskeletal:  Positive for arthralgias (Left thumb). Negative for joint swelling.        See HPI   Skin:  Negative for rash and wound.   Neurological:  Negative for dizziness, numbness and headaches.   Psychiatric/Behavioral:  Negative for decreased concentration and suicidal ideas. The patient is not nervous/anxious.          Objective:  BP Readings from Last 1 Encounters:   07/03/24 138/82      Wt Readings from Last 1 Encounters:   07/03/24 100 kg (221 lb)      BMI:   Estimated body mass index is 35.67 kg/m² as calculated from the following:    Height as of this encounter: 5' 6\" (1.676 m).    Weight as of this encounter: 100 kg (221 lb).  BSA:   Estimated body surface area is 2.08 meters squared as calculated from the following:    Height as of this encounter: 5' 6\" (1.676 m).    Weight as of this encounter: 100 kg (221 lb).   Physical Exam  Vitals and nursing note " reviewed.   Constitutional:       Appearance: Normal appearance. He is well-developed.   HENT:      Head: Normocephalic and atraumatic.      Right Ear: External ear normal.      Left Ear: External ear normal.   Eyes:      General:         Right eye: No discharge.         Left eye: No discharge.      Extraocular Movements: Extraocular movements intact.      Conjunctiva/sclera: Conjunctivae normal.   Pulmonary:      Effort: Pulmonary effort is normal. No respiratory distress.   Musculoskeletal:      Cervical back: Neck supple.      Comments: As noted in ortho exam   Skin:     General: Skin is warm and dry.   Neurological:      Mental Status: He is alert and oriented to person, place, and time.   Psychiatric:         Mood and Affect: Mood normal.         Behavior: Behavior normal.       Left Hand Exam     Tenderness   Left hand tenderness location: A1 pulley of thumb.     Range of Motion   Wrist   Extension:  50   Pronation:  80   Supination:  80   Hand   MP Thumb: 90   DIP Thumb: 90     Other   Erythema: absent  Scars: absent  Sensation: normal  Pulse: present    Comments:  Palpable nodule at the A1 pulley of the flexor tendon at the thumb  Active triggering            I have personally reviewed pertinent films in PACS and my interpretation is X-rays of the left thumb demonstrate no acute fracture or other osseous abnormalities.    Hand/upper extremity injection: L thumb A1  Universal Protocol:  Consent: Verbal consent obtained.  Risks and benefits: risks, benefits and alternatives were discussed  Consent given by: patient  Patient understanding: patient states understanding of the procedure being performed  Site marked: the operative site was marked  Supporting Documentation  Indications: pain   Procedure Details  Condition:trigger finger Location: thumb - L thumb A1   Preparation: Patient was prepped and draped in the usual sterile fashion  Needle size: 25 G  Ultrasound guidance: no  Approach: volar  Medications  administered: 0.3 mL lidocaine 1 %; 1.2 mg betamethasone acetate-betamethasone sodium phosphate 6 (3-3) mg/mL  Patient tolerance: patient tolerated the procedure well with no immediate complications  Dressing:  Sterile dressing applied         Scribe Attestation      I,:  Joey Lowery am acting as a scribe while in the presence of the attending physician.:       I,:  Sandra Cruz MD personally performed the services described in this documentation    as scribed in my presence.:

## 2024-07-09 ENCOUNTER — OFFICE VISIT (OUTPATIENT)
Dept: UROLOGY | Facility: MEDICAL CENTER | Age: 64
End: 2024-07-09
Payer: COMMERCIAL

## 2024-07-09 VITALS
DIASTOLIC BLOOD PRESSURE: 82 MMHG | OXYGEN SATURATION: 98 % | HEIGHT: 66 IN | HEART RATE: 65 BPM | WEIGHT: 223 LBS | BODY MASS INDEX: 35.84 KG/M2 | SYSTOLIC BLOOD PRESSURE: 138 MMHG

## 2024-07-09 DIAGNOSIS — N13.8 BPH WITH OBSTRUCTION/LOWER URINARY TRACT SYMPTOMS: Primary | ICD-10-CM

## 2024-07-09 DIAGNOSIS — N40.1 BPH WITH OBSTRUCTION/LOWER URINARY TRACT SYMPTOMS: Primary | ICD-10-CM

## 2024-07-09 DIAGNOSIS — N20.0 NEPHROLITHIASIS: ICD-10-CM

## 2024-07-09 DIAGNOSIS — Z12.5 PROSTATE CANCER SCREENING: ICD-10-CM

## 2024-07-09 LAB
SL AMB  POCT GLUCOSE, UA: NORMAL
SL AMB LEUKOCYTE ESTERASE,UA: NORMAL
SL AMB POCT BILIRUBIN,UA: NORMAL
SL AMB POCT BLOOD,UA: NORMAL
SL AMB POCT CLARITY,UA: CLEAR
SL AMB POCT COLOR,UA: YELLOW
SL AMB POCT KETONES,UA: NORMAL
SL AMB POCT NITRITE,UA: NORMAL
SL AMB POCT PH,UA: 8
SL AMB POCT SPECIFIC GRAVITY,UA: 1.02
SL AMB POCT URINE PROTEIN: NORMAL
SL AMB POCT UROBILINOGEN: 1

## 2024-07-09 PROCEDURE — 81003 URINALYSIS AUTO W/O SCOPE: CPT | Performed by: UROLOGY

## 2024-07-09 PROCEDURE — 99214 OFFICE O/P EST MOD 30 MIN: CPT | Performed by: UROLOGY

## 2024-07-09 NOTE — PROGRESS NOTES
Ambulatory Visit  Name: Wally Croft      : 1960      MRN: [id number]  Encounter Provider: Cesar Alvarado MD  Encounter Date: 2024   Encounter department: [facility] [place]    Assessment & Plan   1. BPH with obstruction/lower urinary tract symptoms  Comments:  AUA symptom score 8, mild urgency and weakening of urinary stream.  No intervention at this point  Orders:  -     POCT urine dip auto non-scope  2. Nephrolithiasis  Comments:  Last abdominal ultrasound demonstrated no hydronephrosis and no stones  Orders:  -     POCT urine dip auto non-scope  3. Prostate cancer screening  Comments:  JULIANA and PSA not suspicious for malignancy-repeat 1 year      History of Present Illness     Wally Croft is a 63 y.o. male who presents a questionable history of prostate nodule.  The patient however has had completely normal in fact low PSA values and for the most part has no urinary problems other than for slight urgency and slight decrease in the urinary stream force.  The patient denies hematuria gross hematuria urinary incontinence.  He presents for prostate check.  He does have a history of nephrolithiasis however last ultrasound showed no evidence of renal stones.    Review of Systems   All other systems reviewed and are negative.    Pertinent Medical History           Medical History Reviewed by provider this encounter:  Tobacco  Allergies  Meds  Problems  Med Hx  Surg Hx  Fam Hx  Soc   Hx      Past Medical History   Past Medical History:   Diagnosis Date    Acute hepatitis A     Acute prostatitis     Angina pectoris (HCC)     Coronary artery disease     CPAP (continuous positive airway pressure) dependence     Dysphagia     last assessed - 2013    GERD (gastroesophageal reflux disease)     History of radiation exposure to spine     histroy of exposure to radiation    Plantar fasciitis     Sleep apnea     SVT (supraventricular tachycardia)      Past Surgical History:    Procedure Laterality Date    CARDIAC ELECTROPHYSIOLOGY MAPPING AND ABLATION      for svt    COLONOSCOPY      Complete Colonoscopy - 10 yrs, onset 7/31/2009; 7/9/2014 - grade 4 int hemorrhoids, mild diverticulosis in mid sigmoid colon, onset [id number]    CORONARY ANGIOPLASTY WITH STENT PLACEMENT      DENTAL SURGERY      ESOPHAGOGASTRODUODENOSCOPY  04/02/2015    Diagnostic EGD 4/2/15 - gastritis and duodenitis; Sm hiatal hernia, gastric polyp, onset 27 Jun 2012    PROSTATE BIOPSY  2015    needle biopsy    TONSILLECTOMY       Family History   Problem Relation Age of Onset    Hypertension Mother     Breast cancer Mother     Hyperlipidemia Mother     Osteoporosis Mother     Hypertension Father     Coronary artery disease Paternal Grandfather     Colon cancer Neg Hx     Colon polyps Neg Hx      Current Outpatient Medications on File Prior to Visit   Medication Sig Dispense Refill    aspirin 81 MG tablet Take 1 tablet by mouth daily      atorvastatin (LIPITOR) 40 mg tablet Take by mouth      clopidogrel (PLAVIX) 75 mg tablet Take 1 tablet by mouth daily      ezetimibe (ZETIA) 10 mg tablet Take 1 tablet by mouth daily      fexofenadine (ALLEGRA) 180 MG tablet Take 1 tablet by mouth daily as needed (Allergies)      metoprolol succinate (TOPROL-XL) 50 mg 24 hr tablet Take 75 mg by mouth daily  0    nitroglycerin (NITROSTAT) 0.4 mg SL tablet Place 0.4 mg under the tongue every 5 (five) minutes as needed      omeprazole (PRILOSEC) 20 mg delayed release capsule Take by mouth      Vitamin D, Ergocalciferol, 2000 units CAPS Take 2,000 Units by mouth daily       No current facility-administered medications on file prior to visit.     Allergies   Allergen Reactions    Ceftin [Cefuroxime] Other (See Comments)     Difficulty urinating       Current Outpatient Medications on File Prior to Visit   Medication Sig Dispense Refill    aspirin 81 MG tablet Take 1 tablet by mouth daily      atorvastatin (LIPITOR) 40 mg tablet Take by mouth   "    clopidogrel (PLAVIX) 75 mg tablet Take 1 tablet by mouth daily      ezetimibe (ZETIA) 10 mg tablet Take 1 tablet by mouth daily      fexofenadine (ALLEGRA) 180 MG tablet Take 1 tablet by mouth daily as needed (Allergies)      metoprolol succinate (TOPROL-XL) 50 mg 24 hr tablet Take 75 mg by mouth daily  0    nitroglycerin (NITROSTAT) 0.4 mg SL tablet Place 0.4 mg under the tongue every 5 (five) minutes as needed      omeprazole (PRILOSEC) 20 mg delayed release capsule Take by mouth      Vitamin D, Ergocalciferol, 2000 units CAPS Take 2,000 Units by mouth daily       No current facility-administered medications on file prior to visit.      Social History     Tobacco Use    Smoking status: Never    Smokeless tobacco: Never   Substance and Sexual Activity    Alcohol use: Not Currently     Comment: social; occasional alcohol use    Drug use: No    Sexual activity: Yes     AUA SYMPTOM SCORE      Flowsheet Row Most Recent Value   AUA SYMPTOM SCORE    How often have you had a sensation of not emptying your bladder completely after you finished urinating? 0   How often have you had to urinate again less than two hours after you finished urinating? 2   How often have you found you stopped and started again several times when you urinate? 1   How often have you found it difficult to postpone urination? 1   How often have you had a weak urinary stream? 2   How often have you had to push or strain to begin urination? 0   How many times did you most typically get up to urinate from the time you went to bed at night until the time you got up in the morning? 2   Quality of Life: If you were to spend the rest of your life with your urinary condition just the way it is now, how would you feel about that? 2   AUA SYMPTOM SCORE 8          Objective     /82 (BP Location: Left arm, Patient Position: Sitting, Cuff Size: Adult)   Pulse 65   Ht 5' 6\" (1.676 m)   Wt 101 kg (223 lb)   SpO2 98%   BMI 35.99 kg/m²   Physical " Exam  Vitals reviewed.   Constitutional:       General: He is not in acute distress.     Appearance: Normal appearance. He is not ill-appearing, toxic-appearing or diaphoretic.   HENT:      Head: Normocephalic and atraumatic.      Nose: Nose normal.      Mouth/Throat:      Mouth: Mucous membranes are moist.   Eyes:      Extraocular Movements: Extraocular movements intact.   Pulmonary:      Effort: Pulmonary effort is normal. No respiratory distress.   Abdominal:      General: There is no distension.      Palpations: Abdomen is soft.   Genitourinary:     Penis: Normal.       Testes: Normal.      Prostate: Normal.      Rectum: Normal.   Musculoskeletal:      Cervical back: Neck supple.   Skin:     General: Skin is dry.   Neurological:      Mental Status: He is alert and oriented to person, place, and time.   Psychiatric:         Mood and Affect: Mood normal.         Behavior: Behavior normal.         Thought Content: Thought content normal.         Judgment: Judgment normal.       Results  Lab Results   Component Value Date    PSA 0.564 07/02/2024    PSA 0.417 07/01/2024    PSA 0.39 06/27/2023     Lab Results   Component Value Date    GLUCOSE 97 07/14/2015    CALCIUM 8.9 07/02/2024     07/14/2015    K 4.2 07/02/2024    CO2 25 07/02/2024     07/02/2024    BUN 17 07/02/2024    CREATININE 0.98 07/02/2024     Lab Results   Component Value Date    WBC 6.46 07/02/2024    HGB 15.9 07/02/2024    HCT 46.0 07/02/2024    MCV 91 07/02/2024     07/02/2024       Office Urine Dip  Recent Results (from the past 1 hour(s))   POCT urine dip auto non-scope    Collection Time: 07/09/24  4:48 PM   Result Value Ref Range     COLOR,UA YELLOW     CLARITY,UA CLEAR     SPECIFIC GRAVITY,UA 1.020      PH,UA 8.0     LEUKOCYTE ESTERASE,UA NEG     NITRITE,UA NEG     GLUCOSE, UA NEG     KETONES,UA NEG     BILIRUBIN,UA NEG     BLOOD,UA NEG     POCT URINE PROTEIN NEG     SL AMB POCT UROBILINOGEN 1.0    ]    Administrative Statements

## 2024-07-09 NOTE — LETTER
2024     Nikia Ireland DO  [facility] [place] [id number]    Patient: Wally Croft   YOB: 1960   Date of Visit: 2024       Dear Dr. Ireland:    Thank you for referring Wally Croft to me for evaluation. Below are my notes for this consultation.    If you have questions, please do not hesitate to call me. I look forward to following your patient along with you.         Sincerely,        Cesar Alvarado MD        CC: No Recipients    Cesar Alvarado MD  2024  5:23 PM  Sign when Signing Visit  Ambulatory Visit  Name: Wally Croft      : 1960      MRN: [id number]  Encounter Provider: Cesar Alvarado MD  Encounter Date: 2024   Encounter department: [facility] UROLOGY [place]    Assessment & Plan  1. BPH with obstruction/lower urinary tract symptoms  Comments:  AUA symptom score 8, mild urgency and weakening of urinary stream.  No intervention at this point  Orders:  -     POCT urine dip auto non-scope  2. Nephrolithiasis  Comments:  Last abdominal ultrasound demonstrated no hydronephrosis and no stones  Orders:  -     POCT urine dip auto non-scope  3. Prostate cancer screening  Comments:  JULIANA and PSA not suspicious for malignancy-repeat 1 year      History of Present Illness    Wally Croft is a 63 y.o. male who presents a questionable history of prostate nodule.  The patient however has had completely normal in fact low PSA values and for the most part has no urinary problems other than for slight urgency and slight decrease in the urinary stream force.  The patient denies hematuria gross hematuria urinary incontinence.  He presents for prostate check.  He does have a history of nephrolithiasis however last ultrasound showed no evidence of renal stones.    Review of Systems   All other systems reviewed and are negative.    Pertinent Medical History          Medical History Reviewed by provider this encounter:  Tobacco   Allergies  Meds  Problems  Med Hx  Surg Hx  Fam Hx  Soc   Hx      Past Medical History  Past Medical History:   Diagnosis Date   • Acute hepatitis A    • Acute prostatitis    • Angina pectoris (HCC)    • Coronary artery disease    • CPAP (continuous positive airway pressure) dependence    • Dysphagia     last assessed - 24Jun2013   • GERD (gastroesophageal reflux disease)    • History of radiation exposure to spine     histroy of exposure to radiation   • Plantar fasciitis    • Sleep apnea    • SVT (supraventricular tachycardia)      Past Surgical History:   Procedure Laterality Date   • CARDIAC ELECTROPHYSIOLOGY MAPPING AND ABLATION      for svt   • COLONOSCOPY      Complete Colonoscopy - 10 yrs, onset 7/31/2009; 7/9/2014 - grade 4 int hemorrhoids, mild diverticulosis in mid sigmoid colon, onset [id number]   • CORONARY ANGIOPLASTY WITH STENT PLACEMENT     • DENTAL SURGERY     • ESOPHAGOGASTRODUODENOSCOPY  04/02/2015    Diagnostic EGD 4/2/15 - gastritis and duodenitis; Sm hiatal hernia, gastric polyp, onset 27 Jun 2012   • PROSTATE BIOPSY  2015    needle biopsy   • TONSILLECTOMY       Family History   Problem Relation Age of Onset   • Hypertension Mother    • Breast cancer Mother    • Hyperlipidemia Mother    • Osteoporosis Mother    • Hypertension Father    • Coronary artery disease Paternal Grandfather    • Colon cancer Neg Hx    • Colon polyps Neg Hx      Current Outpatient Medications on File Prior to Visit   Medication Sig Dispense Refill   • aspirin 81 MG tablet Take 1 tablet by mouth daily     • atorvastatin (LIPITOR) 40 mg tablet Take by mouth     • clopidogrel (PLAVIX) 75 mg tablet Take 1 tablet by mouth daily     • ezetimibe (ZETIA) 10 mg tablet Take 1 tablet by mouth daily     • fexofenadine (ALLEGRA) 180 MG tablet Take 1 tablet by mouth daily as needed (Allergies)     • metoprolol succinate (TOPROL-XL) 50 mg 24 hr tablet Take 75 mg by mouth daily  0   • nitroglycerin (NITROSTAT) 0.4 mg SL tablet  Place 0.4 mg under the tongue every 5 (five) minutes as needed     • omeprazole (PRILOSEC) 20 mg delayed release capsule Take by mouth     • Vitamin D, Ergocalciferol, 2000 units CAPS Take 2,000 Units by mouth daily       No current facility-administered medications on file prior to visit.     Allergies   Allergen Reactions   • Ceftin [Cefuroxime] Other (See Comments)     Difficulty urinating       Current Outpatient Medications on File Prior to Visit   Medication Sig Dispense Refill   • aspirin 81 MG tablet Take 1 tablet by mouth daily     • atorvastatin (LIPITOR) 40 mg tablet Take by mouth     • clopidogrel (PLAVIX) 75 mg tablet Take 1 tablet by mouth daily     • ezetimibe (ZETIA) 10 mg tablet Take 1 tablet by mouth daily     • fexofenadine (ALLEGRA) 180 MG tablet Take 1 tablet by mouth daily as needed (Allergies)     • metoprolol succinate (TOPROL-XL) 50 mg 24 hr tablet Take 75 mg by mouth daily  0   • nitroglycerin (NITROSTAT) 0.4 mg SL tablet Place 0.4 mg under the tongue every 5 (five) minutes as needed     • omeprazole (PRILOSEC) 20 mg delayed release capsule Take by mouth     • Vitamin D, Ergocalciferol, 2000 units CAPS Take 2,000 Units by mouth daily       No current facility-administered medications on file prior to visit.      Social History     Tobacco Use   • Smoking status: Never   • Smokeless tobacco: Never   Substance and Sexual Activity   • Alcohol use: Not Currently     Comment: social; occasional alcohol use   • Drug use: No   • Sexual activity: Yes     AUA SYMPTOM SCORE      Flowsheet Row Most Recent Value   AUA SYMPTOM SCORE    How often have you had a sensation of not emptying your bladder completely after you finished urinating? 0   How often have you had to urinate again less than two hours after you finished urinating? 2   How often have you found you stopped and started again several times when you urinate? 1   How often have you found it difficult to postpone urination? 1   How often have  "you had a weak urinary stream? 2   How often have you had to push or strain to begin urination? 0   How many times did you most typically get up to urinate from the time you went to bed at night until the time you got up in the morning? 2   Quality of Life: If you were to spend the rest of your life with your urinary condition just the way it is now, how would you feel about that? 2   AUA SYMPTOM SCORE 8          Objective    /82 (BP Location: Left arm, Patient Position: Sitting, Cuff Size: Adult)   Pulse 65   Ht 5' 6\" (1.676 m)   Wt 101 kg (223 lb)   SpO2 98%   BMI 35.99 kg/m²   Physical Exam  Vitals reviewed.   Constitutional:       General: He is not in acute distress.     Appearance: Normal appearance. He is not ill-appearing, toxic-appearing or diaphoretic.   HENT:      Head: Normocephalic and atraumatic.      Nose: Nose normal.      Mouth/Throat:      Mouth: Mucous membranes are moist.   Eyes:      Extraocular Movements: Extraocular movements intact.   Pulmonary:      Effort: Pulmonary effort is normal. No respiratory distress.   Abdominal:      General: There is no distension.      Palpations: Abdomen is soft.   Genitourinary:     Penis: Normal.       Testes: Normal.      Prostate: Normal.      Rectum: Normal.   Musculoskeletal:      Cervical back: Neck supple.   Skin:     General: Skin is dry.   Neurological:      Mental Status: He is alert and oriented to person, place, and time.   Psychiatric:         Mood and Affect: Mood normal.         Behavior: Behavior normal.         Thought Content: Thought content normal.         Judgment: Judgment normal.       Results  Lab Results   Component Value Date    PSA 0.564 07/02/2024    PSA 0.417 07/01/2024    PSA 0.39 06/27/2023     Lab Results   Component Value Date    GLUCOSE 97 07/14/2015    CALCIUM 8.9 07/02/2024     07/14/2015    K 4.2 07/02/2024    CO2 25 07/02/2024     07/02/2024    BUN 17 07/02/2024    CREATININE 0.98 07/02/2024     Lab " Results   Component Value Date    WBC 6.46 07/02/2024    HGB 15.9 07/02/2024    HCT 46.0 07/02/2024    MCV 91 07/02/2024     07/02/2024       Office Urine Dip  Recent Results (from the past 1 hour(s))   POCT urine dip auto non-scope    Collection Time: 07/09/24  4:48 PM   Result Value Ref Range     COLOR,UA YELLOW     CLARITY,UA CLEAR     SPECIFIC GRAVITY,UA 1.020      PH,UA 8.0     LEUKOCYTE ESTERASE,UA NEG     NITRITE,UA NEG     GLUCOSE, UA NEG     KETONES,UA NEG     BILIRUBIN,UA NEG     BLOOD,UA NEG     POCT URINE PROTEIN NEG     SL AMB POCT UROBILINOGEN 1.0    ]    Administrative Statements

## 2024-07-16 ENCOUNTER — OFFICE VISIT (OUTPATIENT)
Dept: OBGYN CLINIC | Facility: CLINIC | Age: 64
End: 2024-07-16
Payer: COMMERCIAL

## 2024-07-16 ENCOUNTER — APPOINTMENT (OUTPATIENT)
Dept: RADIOLOGY | Facility: CLINIC | Age: 64
End: 2024-07-16
Payer: COMMERCIAL

## 2024-07-16 VITALS
BODY MASS INDEX: 35.84 KG/M2 | SYSTOLIC BLOOD PRESSURE: 164 MMHG | WEIGHT: 223 LBS | DIASTOLIC BLOOD PRESSURE: 70 MMHG | HEIGHT: 66 IN | HEART RATE: 60 BPM

## 2024-07-16 DIAGNOSIS — M25.552 LEFT HIP PAIN: ICD-10-CM

## 2024-07-16 DIAGNOSIS — M76.892 HIP FLEXOR TENDONITIS, LEFT: ICD-10-CM

## 2024-07-16 DIAGNOSIS — M25.552 LEFT HIP PAIN: Primary | ICD-10-CM

## 2024-07-16 PROCEDURE — 73502 X-RAY EXAM HIP UNI 2-3 VIEWS: CPT

## 2024-07-16 PROCEDURE — 99213 OFFICE O/P EST LOW 20 MIN: CPT | Performed by: PHYSICIAN ASSISTANT

## 2024-07-16 NOTE — PROGRESS NOTES
Orthopaedic Surgery - Office Note  Wally Croft (63 y.o. male)   : 1960   MRN: [id number]  Encounter Date: 2024    Chief Complaint   Patient presents with    Left Hip - Pain         Assessment/Plan  Diagnoses and all orders for this visit:    Left hip pain  -     XR hip/pelv 2-3 vws left if performed; Future  -     Ambulatory Referral to Physical Therapy; Future    Hip flexor tendonitis, left  -     Ambulatory Referral to Physical Therapy; Future    I reviewed with the patient I do not have a definitive diagnosis for her symptoms that were present several weeks ago and are no longer there.  Currently he has mild hip flexor tendinitis symptoms which would benefit from physical therapy.  He will be referred for evaluation and treatment geared towards a home exercise program as he reports he is leaving for [place] in a few days.    I discussed with the patient the differential would include a hernia that could be evaluated by PCP/general surgeon.  I reviewed his x-rays which do not show severe degenerative changes.  An MRI could be considered to make sure his degenerative changes are not worse on MRI as opposed to the plain films.    If symptoms persist he will call for an appointment upon return to the [place] or be evaluated in [facility] as needed.     Return for Recheck-patient to follow up with hip surgeon upon return to [facility]([place] or [place]).        History of Present Illness  This is a previous orthopedic patient here for a new left hip pain.  He reports that approximately 5 weeks ago he had relatively sudden onset left groin pain.  He reports the pain was severe and he had difficulty bending over or walking.  He reports his symptoms spontaneously resolved after about 2 weeks.  He states he has not had any problems in his hip for 3 to 4 weeks.  He reports currently he has no pain in his left groin.  He denies any problems with the groin prior to this.  He states that he has stents and does not take  "oral anti-inflammatories and did not take any medications to resolve his symptoms.  He denies any trauma that started the onset of the symptoms.  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  He denies any fever or chills.  No red flag symptoms are reported.  He denies any back pain.    Review of Systems  Pertinent items are noted in HPI.  All other systems were reviewed and are negative.    Physical Exam  /70 (BP Location: Left arm, Patient Position: Sitting, Cuff Size: Large)   Pulse 60   Ht 5' 6\" (1.676 m)   Wt 101 kg (223 lb)   BMI 35.99 kg/m²   Cons: Appears well.  No apparent distress.  Psych: Alert. Oriented x3.  Mood and affect normal.  On clinical examination patient has a painless full active and passive range of motion of the left hip without any reproducible pain symptoms.  He has no pain or loss of motion to logrolling and forced internal rotation.  He has mild groin discomfort with strength testing of his hip flexor which is at 4 out of 5.  Otherwise all muscle groups are 5 out of 5 without significant pain.  He has no significant pain to palpation in the left groin.  He is neurovascular intact in the left lower extremity with a negative straight leg raise.  His gait is heel-to-toe in the office today without abnormality.            Studies Reviewed  Independent review of x-rays performed in the office today 3 views of the left hip show no acute fractures or dislocations.  Minimal degenerative changes are seen.  X-rays were reviewed from orthopedic standpoint will await official radiologist interpretation.    Hemoglobin A1c is 5.9.    Procedures  No procedures today.    Medical, Surgical, Family, and Social History  The patient's medical history, family history, and social history, were reviewed and updated as appropriate.    Past Medical History:   Diagnosis " Date    Acute hepatitis A     Acute prostatitis     Angina pectoris (HCC)     Coronary artery disease     CPAP (continuous positive airway pressure) dependence     Dysphagia     last assessed - 24Jun2013    GERD (gastroesophageal reflux disease)     History of radiation exposure to spine     histroy of exposure to radiation    Plantar fasciitis     Sleep apnea     SVT (supraventricular tachycardia)        Past Surgical History:   Procedure Laterality Date    CARDIAC ELECTROPHYSIOLOGY MAPPING AND ABLATION      for svt    COLONOSCOPY      Complete Colonoscopy - 10 yrs, onset 7/31/2009; 7/9/2014 - grade 4 int hemorrhoids, mild diverticulosis in mid sigmoid colon, onset [id number]    CORONARY ANGIOPLASTY WITH STENT PLACEMENT      DENTAL SURGERY      ESOPHAGOGASTRODUODENOSCOPY  04/02/2015    Diagnostic EGD 4/2/15 - gastritis and duodenitis; Sm hiatal hernia, gastric polyp, onset 27 Jun 2012    PROSTATE BIOPSY  2015    needle biopsy    TONSILLECTOMY         Family History   Problem Relation Age of Onset    Hypertension Mother     Breast cancer Mother     Hyperlipidemia Mother     Osteoporosis Mother     Hypertension Father     Coronary artery disease Paternal Grandfather     Colon cancer Neg Hx     Colon polyps Neg Hx        Social History     Occupational History    Occupation:  - full-time   Tobacco Use    Smoking status: Never    Smokeless tobacco: Never   Substance and Sexual Activity    Alcohol use: Not Currently     Comment: social; occasional alcohol use    Drug use: No    Sexual activity: Yes       Allergies   Allergen Reactions    Ceftin [Cefuroxime] Other (See Comments)     Difficulty urinating          Current Outpatient Medications:     aspirin 81 MG tablet, Take 1 tablet by mouth daily, Disp: , Rfl:     atorvastatin (LIPITOR) 40 mg tablet, Take by mouth, Disp: , Rfl:     clopidogrel (PLAVIX) 75 mg tablet, Take 1 tablet by mouth daily, Disp: , Rfl:     ezetimibe (ZETIA) 10 mg tablet, Take 1 tablet  by mouth daily, Disp: , Rfl:     fexofenadine (ALLEGRA) 180 MG tablet, Take 1 tablet by mouth daily as needed (Allergies), Disp: , Rfl:     metoprolol succinate (TOPROL-XL) 50 mg 24 hr tablet, Take 75 mg by mouth daily, Disp: , Rfl: 0    nitroglycerin (NITROSTAT) 0.4 mg SL tablet, Place 0.4 mg under the tongue every 5 (five) minutes as needed, Disp: , Rfl:     omeprazole (PRILOSEC) 20 mg delayed release capsule, Take by mouth, Disp: , Rfl:     Vitamin D, Ergocalciferol, 2000 units CAPS, Take 2,000 Units by mouth daily, Disp: , Rfl:       Nabil Jean PA-C

## 2024-12-26 ENCOUNTER — TELEPHONE (OUTPATIENT)
Dept: FAMILY MEDICINE CLINIC | Facility: HOSPITAL | Age: 64
End: 2024-12-26

## 2024-12-26 DIAGNOSIS — R73.9 HYPERGLYCEMIA: Primary | ICD-10-CM

## 2024-12-26 NOTE — TELEPHONE ENCOUNTER
Patient has PE appt w/ you 12/30.      Asking if you have labs you want done prior to appt?  Last labs done 7/2/24.    Pls email patient to let him know.  (Phone not working)

## 2024-12-30 ENCOUNTER — OFFICE VISIT (OUTPATIENT)
Dept: FAMILY MEDICINE CLINIC | Facility: HOSPITAL | Age: 64
End: 2024-12-30
Payer: COMMERCIAL

## 2024-12-30 VITALS
BODY MASS INDEX: 37.51 KG/M2 | DIASTOLIC BLOOD PRESSURE: 82 MMHG | TEMPERATURE: 98 F | HEART RATE: 55 BPM | SYSTOLIC BLOOD PRESSURE: 144 MMHG | WEIGHT: 233.4 LBS | HEIGHT: 66 IN | OXYGEN SATURATION: 97 %

## 2024-12-30 DIAGNOSIS — R03.0 ELEVATED BLOOD PRESSURE READING IN OFFICE WITHOUT DIAGNOSIS OF HYPERTENSION: ICD-10-CM

## 2024-12-30 DIAGNOSIS — I47.10 PAROXYSMAL SVT (SUPRAVENTRICULAR TACHYCARDIA) (HCC): ICD-10-CM

## 2024-12-30 DIAGNOSIS — Z00.00 ANNUAL PHYSICAL EXAM: Primary | ICD-10-CM

## 2024-12-30 DIAGNOSIS — E66.01 SEVERE OBESITY (BMI 35.0-39.9) WITH COMORBIDITY (HCC): ICD-10-CM

## 2024-12-30 PROCEDURE — 99387 INIT PM E/M NEW PAT 65+ YRS: CPT | Performed by: INTERNAL MEDICINE

## 2024-12-30 RX ORDER — METOPROLOL SUCCINATE 25 MG/1
TABLET, EXTENDED RELEASE ORAL
COMMUNITY
Start: 2024-12-23

## 2024-12-30 NOTE — PROGRESS NOTES
Adult Annual Physical  Name: Wally Croft      : 1960      MRN: [id number]  Encounter Provider: Nikia Ireland DO  Encounter Date: 2024   Encounter department: [facility] PRIMARY CARE [address]     Assessment & Plan  Annual physical exam         Severe obesity (BMI 35.0-39.9) with comorbidity (HCC)    Encouraged to get on track with diet and start formal dedicated exercise and get wgt down, will follow       BMI 37.0-37.9, adult         Paroxysmal SVT (supraventricular tachycardia) (HCC)  S/p ablation last year, no symptoms since then, on Metoprolol, call with CV symptoms       Elevated blood pressure reading in office without diagnosis of hypertension  PT reports BP was much lower earlier today at Cardio appt, urged diet/exercise/wgt loss, on Metoprolol for SVT, will monitor on current regimen, recheck in 6 mos       Immunizations and preventive care screenings were discussed with patient today. Appropriate education was printed on patient's after visit summary.    Discussed risks and benefits of prostate cancer screening. We discussed the controversial history of PSA screening for prostate cancer in the [place] as well as the risk of over detection and over treatment of prostate cancer by way of PSA screening.  The patient understands that PSA blood testing is an imperfect way to screen for prostate cancer and that elevated PSA levels in the blood may also be caused by infection, inflammation, prostatic trauma or manipulation, urological procedures, or by benign prostatic enlargement.    The role of the digital rectal examination in prostate cancer screening was also discussed and I discussed with him that there is large interobserver variability in the findings of digital rectal examination.    Counseling:  Alcohol/drug use: discussed moderation in alcohol intake, the recommendations for healthy alcohol use, and avoidance of illicit drug use.  Dental Health: discussed  importance of regular tooth brushing, flossing, and dental visits.  Injury prevention: discussed safety/seat belts, safety helmets, smoke detectors, carbon monoxide detectors, and smoking near bedding or upholstery.  Exercise: the importance of regular exercise/physical activity was discussed. Recommend exercise 3-5 times per week for at least 30 minutes.   Prostate cancer screening: PSA 7/24  Colon cancer screening: Colonoscopy 7/14 - 10 yrs - pt states he just had colonoscopy in [place] - will try and get us a copy of those records      Depression Screening and Follow-up Plan: Patient was screened for depression during today's encounter. They screened negative with a PHQ-2 score of 1.    BW 12/24    A1C 7/24 (A1C 7/24)    Needs COVID and Shingrix     History of Present Illness     Adult Annual Physical:  Patient presents for annual physical. Pt had cardiac ablation last year d/t SVT. He notes no recent CP/palp/SOB since then. He has felt great since the ablation.  He did note stopping Plavix about 3 mos ago.  He just had FLP done and results were reviewed. He did not do his FBS/A1C. He has gained wgt and admits he is eating more candy and is not as active.   .     Diet and Physical Activity:  - Diet/Nutrition: poor diet and frequent junk food.  - Exercise: no formal exercise.    Depression Screening:  - PHQ-2 Score: 1    General Health:  - Sleep: sleeps well. using CPAP qhs  - Hearing: normal hearing bilateral ears.  - Vision: no vision problems, wears glasses and goes for regular eye exams.  - Dental: regular dental visits and brushes teeth twice daily.     Health:  - History of STDs: no.   - Urinary symptoms: weak urinary stream.     Advanced Care Planning:  - Has an advanced directive?: no    - Has a durable medical POA?: no    - ACP document given to patient?: yes      Review of Systems   Constitutional:  Positive for unexpected weight change. Negative for chills and fever.   HENT:  Negative for congestion,  "hearing loss and trouble swallowing.    Eyes:  Negative for pain and visual disturbance.   Respiratory:  Negative for cough and shortness of breath.    Cardiovascular:  Positive for leg swelling. Negative for chest pain and palpitations.   Gastrointestinal:  Negative for abdominal pain, blood in stool, constipation, diarrhea, nausea and vomiting.   Genitourinary:  Negative for difficulty urinating and dysuria.   Musculoskeletal:  Negative for arthralgias and myalgias.   Skin:  Negative for rash and wound.   Neurological:  Negative for dizziness and headaches.   Hematological:  Does not bruise/bleed easily.   Psychiatric/Behavioral:  Negative for confusion and dysphoric mood.          Objective   /82 (BP Location: Left arm, Patient Position: Sitting, Cuff Size: Standard)   Pulse 55   Temp 98 °F (36.7 °C) (Tympanic)   Ht 5' 6\" (1.676 m)   Wt 106 kg (233 lb 6.4 oz)   SpO2 97%   BMI 37.67 kg/m²     Physical Exam  Vitals and nursing note reviewed.   Constitutional:       General: He is not in acute distress.     Appearance: He is well-developed. He is obese. He is not ill-appearing.   HENT:      Head: Normocephalic and atraumatic.      Right Ear: Tympanic membrane and external ear normal. There is no impacted cerumen.      Left Ear: Tympanic membrane and external ear normal. There is no impacted cerumen.      Mouth/Throat:      Mouth: Mucous membranes are moist.      Pharynx: Oropharynx is clear. No oropharyngeal exudate.   Eyes:      General:         Right eye: No discharge.         Left eye: No discharge.      Conjunctiva/sclera: Conjunctivae normal.   Neck:      Vascular: No carotid bruit.      Trachea: No tracheal deviation.   Cardiovascular:      Rate and Rhythm: Normal rate and regular rhythm.      Heart sounds: Normal heart sounds. No murmur heard.  Pulmonary:      Effort: Pulmonary effort is normal. No respiratory distress.      Breath sounds: Normal breath sounds. No wheezing, rhonchi or rales. "   Abdominal:      General: There is no distension.      Palpations: Abdomen is soft.      Tenderness: There is no abdominal tenderness. There is no guarding or rebound.   Musculoskeletal:         General: No deformity or signs of injury.      Cervical back: Neck supple.   Lymphadenopathy:      Cervical: No cervical adenopathy.   Skin:     General: Skin is warm and dry.      Coloration: Skin is not pale.      Findings: No rash.   Neurological:      General: No focal deficit present.      Mental Status: He is alert. Mental status is at baseline.      Motor: No abnormal muscle tone.      Gait: Gait normal.   Psychiatric:         Mood and Affect: Mood normal.         Behavior: Behavior normal.         Thought Content: Thought content normal.         Judgment: Judgment normal.

## 2024-12-30 NOTE — PATIENT INSTRUCTIONS
"Patient Education     Routine physical for adults   The Basics   Written by the doctors and editors at [facility]   What is a physical? -- A physical is a routine visit, or \"check-up,\" with your doctor. You might also hear it called a \"wellness visit\" or \"preventive visit.\"  During each visit, the doctor will:   Ask about your physical and mental health   Ask about your habits, behaviors, and lifestyle   Do an exam   Give you vaccines if needed   Talk to you about any medicines you take   Give advice about your health   Answer your questions  Getting regular check-ups is an important part of taking care of your health. It can help your doctor find and treat any problems you have. But it's also important for preventing health problems.  A routine physical is different from a \"sick visit.\" A sick visit is when you see a doctor because of a health concern or problem. Since physicals are scheduled ahead of time, you can think about what you want to ask the doctor.  How often should I get a physical? -- It depends on your age and health. In general, for people age 21 years and older:   If you are younger than 50 years, you might be able to get a physical every 3 years.   If you are 50 years or older, your doctor might recommend a physical every year.  If you have an ongoing health condition, like diabetes or high blood pressure, your doctor will probably want to see you more often.  What happens during a physical? -- In general, each visit will include:   Physical exam - The doctor or nurse will check your height, weight, heart rate, and blood pressure. They will also look at your eyes and ears. They will ask about how you are feeling and whether you have any symptoms that bother you.   Medicines - It's a good idea to bring a list of all the medicines you take to each doctor visit. Your doctor will talk to you about your medicines and answer any questions. Tell them if you are having any side effects that bother you. You " "should also tell them if you are having trouble paying for any of your medicines.   Habits and behaviors - This includes:   Your diet   Your exercise habits   Whether you smoke, drink alcohol, or use drugs   Whether you are sexually active   Whether you feel safe at home  Your doctor will talk to you about things you can do to improve your health and lower your risk of health problems. They will also offer help and support. For example, if you want to quit smoking, they can give you advice and might prescribe medicines. If you want to improve your diet or get more physical activity, they can help you with this, too.   Lab tests, if needed - The tests you get will depend on your age and situation. For example, your doctor might want to check your:   Cholesterol   Blood sugar   Iron level   Vaccines - The recommended vaccines will depend on your age, health, and what vaccines you already had. Vaccines are very important because they can prevent certain serious or deadly infections.   Discussion of screening - \"Screening\" means checking for diseases or other health problems before they cause symptoms. Your doctor can recommend screening based on your age, risk, and preferences. This might include tests to check for:   Cancer, such as breast, prostate, cervical, ovarian, colorectal, prostate, lung, or skin cancer   Sexually transmitted infections, such as chlamydia and gonorrhea   Mental health conditions like depression and anxiety  Your doctor will talk to you about the different types of screening tests. They can help you decide which screenings to have. They can also explain what the results might mean.   Answering questions - The physical is a good time to ask the doctor or nurse questions about your health. If needed, they can refer you to other doctors or specialists, too.  Adults older than 65 years often need other care, too. As you get older, your doctor will talk to you about:   How to prevent falling at " home   Hearing or vision tests   Memory testing   How to take your medicines safely   Making sure that you have the help and support you need at home  All topics are updated as new evidence becomes available and our peer review process is complete.  This topic retrieved from [employer] on: May 02, 2024.  Topic 655187 Version 1.0  Release: 32.4.3 - C32.122  © 2024 UpToDate, Inc. and/or its affiliates. All rights reserved.  Consumer Information Use and Disclaimer   Disclaimer: This generalized information is a limited summary of diagnosis, treatment, and/or medication information. It is not meant to be comprehensive and should be used as a tool to help the user understand and/or assess potential diagnostic and treatment options. It does NOT include all information about conditions, treatments, medications, side effects, or risks that may apply to a specific patient. It is not intended to be medical advice or a substitute for the medical advice, diagnosis, or treatment of a health care provider based on the health care provider's examination and assessment of a patient's specific and unique circumstances. Patients must speak with a health care provider for complete information about their health, medical questions, and treatment options, including any risks or benefits regarding use of medications. This information does not endorse any treatments or medications as safe, effective, or approved for treating a specific patient. UpToDate, Inc. and its affiliates disclaim any warranty or liability relating to this information or the use thereof.The use of this information is governed by the Terms of Use, available at https://www.w[employer]ters[employer] Deskuwer.com/en/know/clinical-effectiveness-terms. 2024© UpToDate, Inc. and its affiliates and/or licensors. All rights reserved.  Copyright   © 2024 Up[employer]Date, Inc. and/or its affiliates. All rights reserved.    Patient Education     Diet and health   The Basics   Written by the doctors and  "editors at [employer]   Why is it important to eat a healthy diet? -- It's important to eat a healthy diet because eating the right foods can keep you healthy now and later in life. It can lower the risk of problems like heart disease, diabetes, high blood pressure, and some types of cancer. It can also help you live longer and improve your quality of life.  What kind of diet is best? -- There is no 1 specific diet that experts recommend for everyone. People choose what foods to eat for many different reasons. These include personal preference, culture, Scientologist, allergies or intolerances, and nutritional goals. People also need to consider the cost and availability of different foods.  In general, experts recommend a diet that:   Includes lots of vegetables, fruits, beans, nuts, and whole grains   Limits red and processed meats, unhealthy fats, sugar, salt, and alcohol  What are dietary patterns? -- A dietary \"pattern\" means generally eating certain types of foods while limiting others. Some people need to follow a specific dietary pattern because of their health needs. For example, if you have high blood pressure, your doctor might recommend a diet low in salt.  If you are trying to improve your health in general, choosing a healthy dietary pattern can help. This does not have to mean being very strict about what you eat or avoid. The goal is to think about getting plenty of healthy foods while limiting less healthy foods.  Examples of dietary patterns include:   Mediterranean diet - This involves eating a lot of fruits, vegetables, nuts, and whole grains, and uses olive oil instead of other fats. It also includes some fish, poultry, and dairy products, but not a lot of red meat. Following this diet can help your overall health, and might even lower your risk of having a stroke.   Plant-based diets - These patterns focus on vegetables, fruits, grains, beans, and nuts. They limit or avoid food that comes from " "animals, such as meat and dairy. There are different types of plant-based diets, including vegetarian and vegan.   Low-fat diet - A low-fat diet involves limiting calories from fat. This might help some people keep weight off if that is their goal, but it does not have many other health benefits. If you choose to follow a low-fat diet, it is also important to focus on getting lots of whole grains, legumes, fruits, and vegetables. Limit refined grains and sugar.   Low-cholesterol diet - Cholesterol is found in foods with a lot of saturated fat, like red meat, butter, and cheese. A low-cholesterol diet focuses on limiting the amount of cholesterol that you eat. Limiting the cholesterol in your diet can also help lower the amount of unhealthy fats that you eat.  Which foods are especially healthy? -- Foods that are especially healthy include:   Fruits and vegetables - Eating a diet with lots of fruits and vegetables can help prevent heart disease and stroke. It might also help prevent certain types of cancer. Try to eat fruits and vegetables at each meal and also for snacks. If you don't have fresh fruits and vegetables available, you can eat frozen or canned ones instead. Doctors recommend eating at least 5 servings of fruits or vegetables each day.   Whole grains - Whole-grain foods include 100 percent whole-wheat bread, steel cut oats, and whole-grain pasta. These are healthier than foods made with \"refined\" grains, like white bread and white rice. Eating lots of whole grains instead of refined grains has been shown to help with weight control. It can also lower the risk of several health problems, including colon cancer, heart disease, and diabetes. Doctors recommend that most people try to eat 5 to 8 servings of whole-grain, high-fiber foods each day.   Foods with fiber - Eating foods with a lot of fiber can help prevent heart disease and stroke. If you have type 2 diabetes, it can also help control your blood " "sugar. Foods that have a lot of fiber include vegetables, fruits, beans, nuts, oatmeal, and whole-grain breads and cereals. You can tell how much fiber is in a food by reading the nutrition label (figure 1). Doctors recommend that most people eat about 25 to 34 grams of fiber each day.   Foods with calcium and vitamin D - Babies, children, and adults need calcium and vitamin D to help keep their bones strong. Adults also need calcium and vitamin D to help prevent osteoporosis. Osteoporosis is a condition that causes bones to get \"thin\" and break more easily than usual. Different foods and drinks have calcium and vitamin D in them (figure 2). People who don't get enough calcium and vitamin D in their diet might need to take a supplement. Doctors recommend that most people have 2 to 3 servings of foods with calcium and vitamin D each day.   Foods with protein - Protein helps your muscles and bones stay strong. Healthy foods with a lot of protein include chicken, fish, eggs, beans, nuts, and soy products. Red meat also has a lot of protein, but it also contains fats, which can be unhealthy. Doctors recommend that most people try to eat about 5 servings of protein each day.   Healthy fats - There are different types of fats. Some types of fats are better for your body than others. Healthy fats are \"monounsaturated\" or \"polyunsaturated\" fats. These are found in fatty fish, nuts and nut butters, and avocados. Use plant-based oils when cooking. Examples of these oils include olive, canola, safflower, sunflower, and corn oil. Eating foods with healthy fats, while avoiding or limiting foods with unhealthy fats, might lower the risk of heart disease.   Foods with folate - Folate is a vitamin that is important for pregnant people, since it helps prevent certain birth defects. It is also called \"folic acid.\" Anyone who could get pregnant should get at least 400 micrograms of folic acid daily, whether or not they are actively " "trying to get pregnant. Folate is found in many breakfast cereals, oranges, orange juice, and green leafy vegetables.  What foods should I avoid or limit? -- To eat a healthy diet, there are some things that you should avoid or limit. They include:   Unhealthy fats - \"Trans\" fats are especially unhealthy. They are found in margarines, many fast foods, and some store-bought baked goods. \"Saturated\" fats are found in animal products like meats, egg yolks, butter, cheese, and full-fat milk products. Unhealthy fats can raise your cholesterol level and increase your chance of getting heart disease.   Sugar - To have a healthy diet, it's important to limit or avoid added sugar, sweets, and refined grains. Refined grains are found in white bread, white rice, most pastas, and most packaged \"snack\" foods.  Avoiding sugar-sweetened beverages, like soda and sports drinks, can also help improve your health.  Avoid canned fruits in \"heavy\" syrup.   Red and processed meats - Studies have shown that eating a lot of red meat can increase your risk of certain health problems, including heart disease and cancer. You should limit the amount of red meat that you eat. This is also true for processed meats like sausage, hot dogs, and mills.  Can I drink alcohol as part of a healthy diet? -- Not drinking alcohol at all is the healthiest choice. Regular drinking can raise a person's chances of getting liver disease and certain types of cancers. In females, even 1 drink a day can increase the risk of getting breast cancer.  If you do choose to drink, most doctors recommend limiting alcohol to no more than:   1 drink a day for females   2 drinks a day for males  The limits are different because, generally, the female body takes longer to break down alcohol.  How many calories do I need each day? -- Calories give your body energy. The number of calories that you need each day depends on your weight, height, age, sex, and how active you " "are.  Your doctor or nurse can tell you about how many calories you should eat each day. You can also work with a dietitian (nutrition expert) to learn more about your dietary needs and options.  What if I am having trouble improving my diet? -- It can be hard to change the way that you eat. Remember that even small changes can improve your health.  Here are some tips that might help:   Try to make fruits and vegetables part of every meal. If you don't have fresh fruits and vegetables, frozen or canned are good options. Look for products without added salt or sugar.   Keep a bowl of fruit out for snacking.   When you can, choose whole grains instead of refined grains. Choose chicken, fish, and beans instead of red meat and cheese.   Try to eat prepared and processed foods less often.   Try flavored seltzer or water instead of soda or juice.   When eating at fast food restaurants, look for healthier items, like broiled chicken or salad.  If you have questions about which foods you should or should not eat, ask your doctor, nurse, or dietitian. The right diet for you will depend, in part, on your health and any medical conditions you have.  All topics are updated as new evidence becomes available and our peer review process is complete.  This topic retrieved from [employer] on: Feb 28, 2024.  Topic 19038 Version 28.0  Release: 32.2.4 - C32.58  © 2024 UpToDate, Inc. and/or its affiliates. All rights reserved.  figure 1: Nutrition label - Fiber     This is an example of a nutrition label. To figure out how much fiber is in a food, look for the line that says \"Dietary Fiber.\" It's also important to look at the serving size. This food has 7 grams of fiber in each serving, and each serving is 1 cup.  Graphic 54296 Version 8.0  figure 2: Foods and drinks with calcium and vitamin D     Foods rich in calcium include ice cream, soy milk, breads, kale, broccoli, milk, cheese, cottage cheese, almonds, yogurt, ready-to-eat cereals, " "beans, and tofu. Foods rich in vitamin D include milk, fortified plant-based \"milks\" (soy, almond), canned tuna fish, cod liver oil, yogurt, ready-to-eat-cereals, cooked salmon, canned sardines, mackerel, and eggs. Some of these foods are rich in both.  Graphic 78267 Version 4.0  Consumer Information Use and Disclaimer   Disclaimer: This generalized information is a limited summary of diagnosis, treatment, and/or medication information. It is not meant to be comprehensive and should be used as a tool to help the user understand and/or assess potential diagnostic and treatment options. It does NOT include all information about conditions, treatments, medications, side effects, or risks that may apply to a specific patient. It is not intended to be medical advice or a substitute for the medical advice, diagnosis, or treatment of a health care provider based on the health care provider's examination and assessment of a patient's specific and unique circumstances. Patients must speak with a health care provider for complete information about their health, medical questions, and treatment options, including any risks or benefits regarding use of medications. This information does not endorse any treatments or medications as safe, effective, or approved for treating a specific patient. UpToDate, Inc. and its affiliates disclaim any warranty or liability relating to this information or the use thereof.The use of this information is governed by the Terms of Use, available at https://www.[employer].uwer.com/en/know/clinical-effectiveness-terms. 2024© UpToDate, Inc. and its affiliates and/or licensors. All rights reserved.  Copyright   © 2024 UpToDate, Inc. and/or its affiliates. All rights reserved.    Patient Education     Exercise and movement   The Basics   Written by the doctors and editors at [employer]   What are the benefits of movement? -- Moving your body has many benefits. It can:   Burn calories, which helps people " manage their weight   Help control blood sugar levels in people with diabetes   Lower blood pressure, especially in people with high blood pressure   Lower stress, and help with depression and anxiety   Keep bones strong, so they don't get thin and break easily   Lower the chance of dying from heart disease  Adding even small amounts of physical activity to your daily routine can improve your health.  What are the main types of exercise? -- There are 3 main types of exercise:   Aerobic exercise - This raises your heart rate. Examples include walking, running, dancing, riding a bike, and swimming.   Muscle strengthening - This helps make your muscles stronger. You can do it using weights, exercise bands, or weight machines. You can also use your own body weight, as with push-ups, or by lifting items in your home, like jugs of water.   Stretching - These help your muscles and joints move more easily.  It's important to have all 3 types in your exercise program. That way, your body, muscles, and joints can be as healthy as possible.  Should I talk to my doctor or nurse before I start exercising? -- If you have not exercised before or have not exercised in a long time, talk with your doctor or nurse before you start a very active exercise program.  If you have heart disease or risk factors for heart disease (like high blood pressure or diabetes), your doctor or nurse might recommend that you have an exercise test before starting an exercise program.  When you begin an exercise program, start slowly. For example, do the exercise at a slow pace or for a few minutes only. Over time, you can exercise faster and for longer periods of time.  What should I do when I exercise? -- Each time you exercise, you should:   Warm up - This can help keep you from hurting your muscles when you exercise. To warm up, do a light aerobic exercise (such as walking slowly) or stretch for 5 to 10 minutes.   Work out - Try to get a mix of  aerobic exercise, muscle strengthening, and stretching. During an aerobic workout, you can walk fast, swim, run, or use an exercise machine, for example. Other activities, like dancing or playing tennis, are also forms of aerobic exercise. You should also take time to stretch all of your joints, including your neck, shoulders, back, hips, and knees. At least 2 times a week, you can do muscle strengthening exercises as part of your workout.   Cool down - This helps keep you from feeling dizzy after you exercise and helps prevent muscle cramps. To cool down, you can stretch or do a light aerobic exercise for 5 minutes.  Some people go to a gym or do group exercise classes. But you can exercise even without these things. Some exercises can be done even in a small space. You can also try online videos or smartphone apps to get ideas for different types of exercise.  How often should I exercise? -- Doctors recommend that people exercise at least 30 minutes a day, on 5 or more days of the week.  If you can't exercise for 30 minutes straight, try to exercise for 10 minutes at a time, 3 or 4 times a day. Even exercising for shorter amounts of time is good for you, especially if it means spending less time sitting.  When should I call my doctor or nurse? -- If you have any of the following symptoms when you exercise, stop exercising and call your doctor or nurse right away:   Pain or pressure in your chest, arms, throat, jaw, or back   Nausea or vomiting   Feeling like your heart is fluttering or racing very fast   Feeling dizzy or faint  What if I don't have time to exercise? -- Many people have very busy lives and might not think that they have time to exercise. But it's important to try to find time, even if you are tired or work a lot. Exercise can increase your energy level, which can make you feel better and might even help you get more work done.  Even if it's hard to set aside a lot of time to exercise, you can still  improve your health by moving your body more. There are many ways that you can be more active. For example, you can:   Take the stairs instead of the elevator.   Park in a parking space that is farther away from the door.   Take a longer route when you walk from one place to another.  Spending a lot of time sitting still (for example, watching TV or working on the computer) is bad for your health. Try to get up and move around whenever you can. Even small amounts of movement, like taking short walks, doing household chores, or gardening, can improve your health. Finding activities that you enjoy, or doing them with other people, can help you add more movement into your daily life.  What else should I do when I exercise? -- To exercise safely and avoid problems, it's important to:   Drink fluids during and after exercising (but avoid drinks with a lot of caffeine or sugar).   Avoid exercising outside if it is too hot or cold.   Wear layers of clothes, so that you can take them off if you get too hot.   Wear shoes that fit well and support your feet.   Be aware of your surroundings if you exercise outside.  All topics are updated as new evidence becomes available and our peer review process is complete.  This topic retrieved from [employer] on: May 18, 2024.  Topic 68923 Version 31.0  Release: 32.4.3 - C32.137  © 2024 UpToDate, Inc. and/or its affiliates. All rights reserved.  Consumer Information Use and Disclaimer   Disclaimer: This generalized information is a limited summary of diagnosis, treatment, and/or medication information. It is not meant to be comprehensive and should be used as a tool to help the user understand and/or assess potential diagnostic and treatment options. It does NOT include all information about conditions, treatments, medications, side effects, or risks that may apply to a specific patient. It is not intended to be medical advice or a substitute for the medical advice, diagnosis, or  treatment of a health care provider based on the health care provider's examination and assessment of a patient's specific and unique circumstances. Patients must speak with a health care provider for complete information about their health, medical questions, and treatment options, including any risks or benefits regarding use of medications. This information does not endorse any treatments or medications as safe, effective, or approved for treating a specific patient. UpToDate, Inc. and its affiliates disclaim any warranty or liability relating to this information or the use thereof.The use of this information is governed by the Terms of Use, available at https://www.[employer].com/en/know/clinical-effectiveness-terms. 2024© UpToDate, Inc. and its affiliates and/or licensors. All rights reserved.  Copyright   © 2024 UpToDate, Inc. and/or its affiliates. All rights reserved.    Patient Education     Mediterranean diet   The Basics   Written by the doctors and editors at [employer]   What is a Mediterranean diet? -- A Mediterranean diet is a heart-healthy way of eating. It includes foods and cooking styles from many countries around the [place], like [place] and [place]. The exact foods included vary from place to place.  A Mediterranean diet involves eating a lot of fruits, vegetables, nuts, and whole grains. It uses olive oil instead of other fats. It also includes some fish, poultry, and dairy products, but not a lot of red meat.  Wine is often thought of as part of a Mediterranean diet. It is not needed, and you might choose not to include it. If you do drink alcohol, limit the amount to:   For females, no more than 1 drink a day   For males, no more than 2 drinks a day  What are the benefits of a Mediterranean diet? -- A Mediterranean diet can help you:   Improve your overall health, and help you lose weight   Lower your risk of stroke   Lower your risk of heart problems such as a heart attack   Manage  your blood sugar if you have diabetes  What can I eat and drink on a Mediterranean diet? -- A Mediterranean diet is more of an eating pattern than a strict diet. Try to cover two-thirds of your plate with fresh fruits and vegetables. Some examples of foods that are often part of this pattern:   Grains - Whole grains like whole-grain bread and pasta, oats, couscous, brown rice, barley, and orzo.   Fruits - Many kinds and colors of fresh, frozen, dried, or canned fruits. Frozen or canned fruits with 100% fruit juice or water (without added sugar). Examples include apples, pears, berries, melons, bananas, plums, raisins, figs, and peaches.   Vegetables - Many kinds and colors of fresh, frozen, or canned vegetables. If canned, low sodium or salt free. If frozen, without added fat and sodium. Examples include avocados, peppers, tomatoes, spinach, kale, beans, carrots, peas, olives, cucumbers, hummus, soybeans, lentils, and kidney beans.   Dairy - Low-fat milk, cheese, and other dairy products. Greek yogurt, kefir, and plant-based milk alternatives like soy milk.   Lean meats, poultry, seafood, and proteins - Norco, tuna, cod, and other fish. Shrimp, clams, scallops, and mussels. White meat chicken and turkey, eggs, dried beans, lentils, and tofu. Nuts such as walnuts, almonds, pecans, hazelnuts, cashews, peanuts, and nut butters. Seeds such as pumpkin, sesame, flax, and sunflower seeds.   Fats, oils, and other foods - Foods with healthy fats found in fish, nuts, and avocados. Olive oil or vegetable oils such as canola oil. Use onions, garlic, spices, and herbs to season food.  What foods and drinks should I avoid or limit on a Mediterranean diet? -- A Mediterranean diet involves avoiding or limiting certain types of foods. Try to avoid foods with additives like artificial sweeteners. Avoid foods that are processed, refined, or preserved. These are often foods with a very long shelf life.   Grains to avoid - White bread,  pasta, white rice, crackers, and biscuits.   Fruits to avoid - Fruits canned or frozen with extra sugar.   Vegetables to avoid - Commercially prepared potatoes and vegetable mixes, regular canned vegetables and juices, and vegetables frozen with sauce or pickled vegetables.   Dairy to avoid - Whole-fat dairy products like cheese, ice cream, whole milk, cream, and buttermilk.   Lean meats, poultry, seafood, and proteins to avoid - Red meat such as beef, pork, and lamb. Processed meats such as sausages, deli meats, salami, hot dogs, and mills.   Fats, oils, and other foods to avoid - Butter, margarine, lard, gravies, sauces, and salad dressing. Cookies, cakes, candy, doughnuts, muffins, and other sweets.  All topics are updated as new evidence becomes available and our peer review process is complete.  This topic retrieved from [employer] on: Apr 04, 2024.  Topic 085591 Version 2.0  Release: 32.2.4 - C32.93  © 2024 UpToDate, Inc. and/or its affiliates. All rights reserved.  Consumer Information Use and Disclaimer   Disclaimer: This generalized information is a limited summary of diagnosis, treatment, and/or medication information. It is not meant to be comprehensive and should be used as a tool to help the user understand and/or assess potential diagnostic and treatment options. It does NOT include all information about conditions, treatments, medications, side effects, or risks that may apply to a specific patient. It is not intended to be medical advice or a substitute for the medical advice, diagnosis, or treatment of a health care provider based on the health care provider's examination and assessment of a patient's specific and unique circumstances. Patients must speak with a health care provider for complete information about their health, medical questions, and treatment options, including any risks or benefits regarding use of medications. This information does not endorse any treatments or medications as safe,  "effective, or approved for treating a specific patient. UpToDate, Inc. and its affiliates disclaim any warranty or liability relating to this information or the use thereof.The use of this information is governed by the Terms of Use, available at https://www.[employer].com/en/know/clinical-effectiveness-terms. 2024© UpToDate, Inc. and its affiliates and/or licensors. All rights reserved.  Copyright   © 2024 UpToDate, Inc. and/or its affiliates. All rights reserved.    Patient Education     High cholesterol   The Basics   Written by the doctors and editors at [employer]   What is cholesterol? -- Cholesterol is a substance found in blood. Everyone has some. It is needed for good health. But people sometimes have too much cholesterol.  Compared with people with normal cholesterol, people with high cholesterol have a higher risk of heart attack, stroke, and other health problems. The higher your cholesterol, the higher your risk of these problems.  Are there different types of cholesterol? -- Yes, there are a few different types. If you get a cholesterol test, you might hear your doctor or nurse talk about:   Total cholesterol   LDL cholesterol - Some people call this the \"bad\" cholesterol. That's because having high LDL levels raises your risk of heart attack, stroke, and other health problems.   HDL cholesterol - Some people call this the \"good\" cholesterol. That's because people with high HDL levels tend to have a lower risk of heart attack, stroke, and other health problems.   Non-HDL cholesterol - Non-HDL cholesterol is your total cholesterol minus your HDL cholesterol.   Triglycerides - Triglycerides are not cholesterol. They are another type of fat. But they often get measured when cholesterol is measured. (Having high triglycerides also seems to increase the risk of heart attack and stroke.)  What should my numbers be? -- Ask your doctor or nurse what your numbers should be. Different people need different " "goals. If you live outside of the [place], see the table (table 1).  In general, people who do not already have heart disease should aim for:   Total cholesterol below 200   LDL cholesterol below 130, or much lower if they are at risk of heart attack or stroke   HDL cholesterol above 60   Non-HDL cholesterol below 160, or lower if they are at risk of heart attack or stroke   Triglycerides below 150  Remember, though, that many people who cannot meet these goals still have a low risk of heart attack and stroke.  What should I do if I have high cholesterol? -- Ask your doctor what your overall risk of heart attack and stroke is. Just having high cholesterol is not always a reason to worry. Having high cholesterol is just one of many things that can increase your risk of heart attack and stroke.  Other things that increase your risk include:   Smoking   High blood pressure   Having a parent or sibling who got heart disease at a young age - Young, in this case, means younger than 55 for males and younger than 65 for females.   A diet that is not heart healthy - A \"heart-healthy\" diet includes lots of fruits and vegetables, fiber, and healthy fats (like those found in fish, nuts, and certain oils). It also means limiting sugar and unhealthy fats.   Older age  If you are at high risk of heart attack and stroke, having high cholesterol is a problem. But if you are at low risk, high cholesterol might not need treatment.  Should I take medicine to lower cholesterol? -- Not everyone who has high cholesterol needs medicines. Your doctor or nurse will decide if you need them based on your age, family history, and other health concerns.  There are many different medicines used to lower cholesterol (table 2). Some help your body make less cholesterol. Some keep your body from absorbing cholesterol from foods. Some help your body get rid of cholesterol faster. The medicines most often used to treat high cholesterol are called " "\"statins.\"  You should probably take a statin if you:   Already had a heart attack or stroke   Have known heart disease   Have diabetes   Have a condition called \"peripheral artery disease,\" which makes it painful to walk, and happens when the arteries in your legs get clogged with fatty deposits   Have an \"abdominal aortic aneurysm,\" which is a widening of the main artery in the belly  Most people with any of the conditions listed above should take a statin no matter what their cholesterol level is. If your doctor or nurse prescribes a statin, it's important to keep taking it. The medicine might not make you feel any different. But it can help prevent heart attack, stroke, and death.  If your doctor or nurse recommends taking medicine to help lower your cholesterol, make sure that you know what it is called. Follow all the instructions for how to take it. For example, some medicines work better when you take them in the evening. Some need to be taken with food.  Tell your doctor or nurse if your medicine causes any side effects that bother you. They might be able to switch you to a different medicine.  Can I lower my cholesterol without medicines? -- Yes. You can help lower your cholesterol by doing these things:   You can lower your LDL, or \"bad,\" cholesterol by avoiding red meat, butter, fried foods, cheese, and other foods that have a lot of saturated fat.   You can lower triglycerides by avoiding sugary foods, fried foods, and excess alcohol.   If you have excess weight, it can help to lose weight. Your doctor or nurse can help you do this in a healthy way.   Try to get regular physical activity. Even gentle forms of exercise, like walking, are good for your health.  Even if these steps don't change your cholesterol very much, they can improve your health in many other ways.  All topics are updated as new evidence becomes available and our peer review process is complete.  This topic retrieved from [employer] on: " Mar 01, 2024.  Topic 66474 Version 25.0  Release: 32.2.4 - C32.59  © 2024 UpToDate, Inc. and/or its affiliates. All rights reserved.  table 1: Cholesterol and triglyceride measurements in the [place] and elsewhere     Measurement used within the [place] Milligrams/deciliter (mg/dL)  Measurement used most places outside of the [place] Millimoles/liter (mmol/Liter)     Level to aim for  Level to aim for    Total cholesterol  Below 200 Below 5.17   LDL cholesterol  Below 130, or much lower if at risk of heart attack and stroke Below 3.36, or much lower if at risk of heart attack and stroke   HDL cholesterol  Above 60 Above 1.55   Triglycerides  Below 150 Below 1.7   Cholesterol is measured differently in the [place] than it is in most other countries. This table shows values used within and outside of the [place]. It includes the cholesterol and triglyceride levels that most people who do not have heart disease should aim for.  Graphic 86385 Version 5.0  table 2: Lipid-lowering medicines  Generic name  Brand name    Statins    Atorvastatin Lipitor   Fluvastatin Lescol, Lescol XL   Lovastatin Mevacor, Altoprev   Pitavastatin Livalo   Pravastatin Pravachol   Rosuvastatin Crestor   Simvastatin Zocor   PCSK9 inhibitors    Alirocumab Praluent   Evolocumab Repatha, Repatha SureClick   Cholesterol absorption inhibitors    Ezetimibe Zetia   Bile acid sequestrants    Cholestyramine Prevalite, Questran, Questran Light   Colesevelam Welchol   Colestipol Colestid   Niacin (nicotinic acid)    Niacin immediate release     Niacin extended release Niaspan   Fibrates    Fenofibrate Fenoglide, Tricor, Triglide, others   Gemfibrozil Lopid   Brand names listed are for medicines available in the [place] and some other countries.  Graphic 47870 Version 7.0  Consumer Information Use and Disclaimer   Disclaimer: This generalized information is a limited summary of diagnosis, treatment, and/or medication information. It is not meant to be comprehensive and should be used  as a tool to help the user understand and/or assess potential diagnostic and treatment options. It does NOT include all information about conditions, treatments, medications, side effects, or risks that may apply to a specific patient. It is not intended to be medical advice or a substitute for the medical advice, diagnosis, or treatment of a health care provider based on the health care provider's examination and assessment of a patient's specific and unique circumstances. Patients must speak with a health care provider for complete information about their health, medical questions, and treatment options, including any risks or benefits regarding use of medications. This information does not endorse any treatments or medications as safe, effective, or approved for treating a specific patient. UpToDate, Inc. and its affiliates disclaim any warranty or liability relating to this information or the use thereof.The use of this information is governed by the Terms of Use, available at https://www.[employer].com/en/know/clinical-effectiveness-terms. 2024© UpToDate, Inc. and its affiliates and/or licensors. All rights reserved.  Copyright   © 2024 UpToDate, Inc. and/or its affiliates. All rights reserved.

## 2025-07-01 ENCOUNTER — OFFICE VISIT (OUTPATIENT)
Dept: OBGYN CLINIC | Facility: CLINIC | Age: 65
End: 2025-07-01
Payer: COMMERCIAL

## 2025-07-01 ENCOUNTER — APPOINTMENT (OUTPATIENT)
Dept: RADIOLOGY | Facility: CLINIC | Age: 65
End: 2025-07-01
Attending: ORTHOPAEDIC SURGERY
Payer: COMMERCIAL

## 2025-07-01 VITALS — HEIGHT: 66 IN | WEIGHT: 228 LBS | BODY MASS INDEX: 36.64 KG/M2

## 2025-07-01 DIAGNOSIS — M75.42 IMPINGEMENT SYNDROME OF LEFT SHOULDER: Primary | ICD-10-CM

## 2025-07-01 DIAGNOSIS — M77.8 LEFT SHOULDER TENDONITIS: ICD-10-CM

## 2025-07-01 DIAGNOSIS — M25.512 ACUTE PAIN OF LEFT SHOULDER: ICD-10-CM

## 2025-07-01 PROCEDURE — 73030 X-RAY EXAM OF SHOULDER: CPT

## 2025-07-01 PROCEDURE — 99213 OFFICE O/P EST LOW 20 MIN: CPT | Performed by: ORTHOPAEDIC SURGERY

## 2025-07-01 NOTE — PROGRESS NOTES
Assessment:     1. Impingement syndrome of left shoulder    2. Left shoulder tendonitis    3. Acute pain of left shoulder        Plan:     Problem List Items Addressed This Visit          Musculoskeletal and Integument    Impingement syndrome of left shoulder - Primary    Findings are consistent with left shoulder impingement syndrome. Findings and treatment options discussed with patient. X-rays of the left shoulder were reviewed and discussed with patient. Discussed prognosis of condition with patient. Referral to physical therapy given at today's visit with focus on rotator cuff and periscapular strengthening. Discussed at home exercise program in addition to physical therapy.  Discussed with patient to avoid any repetitive overhead or lifting activities.  Discussed if he is lifting to left close to the body.  I explained this helps to protect the shoulder.  Patient can use Tylenol, along with topical Voltaren gel or Aspercreme as needed for pain.  Patient states he lives in [facility] and will be moving back around the end of July.  Discussed in the future if pain continues and is consistent we could consider a cortisone injection.  Patient can follow-up as needed. All patient's questions were answered to his satisfaction.  This note is created using dictation transcription.  It may contain typographical errors, grammatical errors, improperly dictated words, background noise and other errors.         Relevant Orders    Ambulatory Referral to Physical Therapy    Left shoulder tendonitis     Other Visit Diagnoses         Acute pain of left shoulder        Relevant Orders    XR shoulder 2+ vw left (Completed)           Subjective:     Patient ID: Wally Croft is a 64 y.o. male.  Chief Complaint:  64 y.o. male presents today for evaluation for left shoulder pain.  He reports having shoulder pain for the last several months.  He notes originally it was a gradual onset with mostly a soreness in the shoulder.  He states  that sitting at his desk at home he will do a lot of leaning on the left upper extremity.  He notes about 2-1/2 months ago while sitting in his desk chair, he dropped something and went to pick it up with his left hand.  During this the chair rolled out from underneath him and he landed directly onto his left shoulder.  He states he had immediate pain in the left shoulder and states he was unable to move the shoulder for 2 to 3 days.  Over time he has slowly worked his shoulder range of motion and notes he has much improved mobility from when he first injured the shoulder.  He states he has some lingering pain over the lateral and anterior aspect of the shoulder.  He states any movements behind him like putting on a jacket or reaching behind him are the most painful.  He states he has not taken anything for the pain.  He does note having some tenderness of the musculature around his neck on occasion.  He denies any radiating symptoms of numbness or tingling down the arm.    Allergy:  Allergies[1]  Medications:  all current active meds have been reviewed  Past Medical History:  Past Medical History[2]  Past Surgical History:  Past Surgical History[3]  Family History:  Family History[4]  Social History:  Social History     Substance and Sexual Activity   Alcohol Use Not Currently    Comment: social; occasional alcohol use     Social History     Substance and Sexual Activity   Drug Use No     Tobacco Use History[5]  Review of Systems   Constitutional:  Negative for chills and fever.   HENT:  Negative for ear pain and sore throat.    Eyes:  Negative for pain and visual disturbance.   Respiratory:  Negative for cough and shortness of breath.    Cardiovascular:  Negative for chest pain and palpitations.   Gastrointestinal:  Negative for abdominal pain and vomiting.   Genitourinary:  Negative for dysuria and hematuria.   Musculoskeletal:  Positive for arthralgias (Left shoulder). Negative for back pain, joint swelling and  "neck pain.   Skin:  Negative for color change and rash.   Neurological:  Negative for seizures, syncope, weakness and numbness.   Psychiatric/Behavioral: Negative.     All other systems reviewed and are negative.      Objective:  BP Readings from Last 1 Encounters:   12/30/24 144/82      Wt Readings from Last 1 Encounters:   07/01/25 103 kg (228 lb)      BMI:   Estimated body mass index is 36.8 kg/m² as calculated from the following:    Height as of this encounter: 5' 6\" (1.676 m).    Weight as of this encounter: 103 kg (228 lb).  BSA:   Estimated body surface area is 2.11 meters squared as calculated from the following:    Height as of this encounter: 5' 6\" (1.676 m).    Weight as of this encounter: 103 kg (228 lb).   Physical Exam  Vitals and nursing note reviewed.   Constitutional:       Appearance: Normal appearance. He is well-developed.   HENT:      Head: Normocephalic and atraumatic.      Right Ear: External ear normal.      Left Ear: External ear normal.      Nose: Nose normal.     Eyes:      General: No scleral icterus.     Extraocular Movements: Extraocular movements intact.      Conjunctiva/sclera: Conjunctivae normal.     Pulmonary:      Effort: Pulmonary effort is normal. No respiratory distress.     Musculoskeletal:      Cervical back: Neck supple.      Comments: See Ortho exam     Skin:     General: Skin is warm and dry.     Neurological:      General: No focal deficit present.      Mental Status: He is alert and oriented to person, place, and time.     Psychiatric:         Mood and Affect: Mood normal.         Behavior: Behavior normal.       Left Shoulder Exam     Tenderness   Left shoulder tenderness location: lateral shoulder.    Range of Motion   External rotation:  50   Forward flexion:  170   Internal rotation 0 degrees:  Sacrum (pain)   Left shoulder internal rotation 90 degrees: Pain.     Muscle Strength   Abduction: 5/5   Internal rotation: 5/5   External rotation: 5/5   Biceps: 5/5 "     Tests   Morris test: positive  Cross arm: negative  Impingement: positive  Drop arm: negative    Other   Erythema: absent  Scars: absent  Sensation: normal  Pulse: present     Comments:  Positive empty can  Negative Speed           I have personally reviewed pertinent films in PACS and my interpretation is x-rays of left shoulder demonstrates no acute fracture or dislocation. No osseous abnormalities.  No soft tissue calcification.  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  Type II acromion process.    Scribe Attestation      I,:  Charlie Kaur am acting as a scribe while in the presence of the attending physician.:       I,:  Sandra Cruz MD personally performed the services described in this documentation    as scribed in my presence.:                [1]   Allergies  Allergen Reactions    Ceftin [Cefuroxime] Other (See Comments)     Difficulty urinating    [2]   Past Medical History:  Diagnosis Date    Acute hepatitis A     Acute prostatitis     Angina pectoris (HCC)     CPAP (continuous positive airway pressure) dependence     GERD (gastroesophageal reflux disease)     History of radiation exposure to spine     histroy of exposure to radiation   [3]   Past Surgical History:  Procedure Laterality Date    CARDIAC ELECTROPHYSIOLOGY MAPPING AND ABLATION      for svt    COLONOSCOPY      Complete Colonoscopy - 10 yrs, onset 7/31/2009; 7/9/2014 - grade 4 int hemorrhoids, mild diverticulosis in mid sigmoid colon, onset [id number]    CORONARY ANGIOPLASTY WITH STENT PLACEMENT      DENTAL SURGERY      ESOPHAGOGASTRODUODENOSCOPY  04/02/2015    Diagnostic EGD 4/2/15 - gastritis and duodenitis; Sm hiatal hernia, gastric polyp, onset 27 Jun 2012    PROSTATE BIOPSY  2015    needle biopsy    TONSILLECTOMY     [4]   Family History  Problem Relation Name Age of Onset    Hypertension Mother      Breast cancer Mother      Hyperlipidemia Mother      Osteoporosis Mother      Hypertension Father      Coronary artery disease  Paternal Grandfather      Colon cancer Neg Hx      Colon polyps Neg Hx     [5]   Social History  Tobacco Use   Smoking Status Never   Smokeless Tobacco Never

## 2025-07-01 NOTE — ASSESSMENT & PLAN NOTE
Findings are consistent with left shoulder impingement syndrome. Findings and treatment options discussed with patient. X-rays of the left shoulder were reviewed and discussed with patient. Discussed prognosis of condition with patient. Referral to physical therapy given at today's visit with focus on rotator cuff and periscapular strengthening. Discussed at home exercise program in addition to physical therapy.  Discussed with patient to avoid any repetitive overhead or lifting activities.  Discussed if he is lifting to left close to the body.  I explained this helps to protect the shoulder.  Patient can use Tylenol, along with topical Voltaren gel or Aspercreme as needed for pain.  Patient states he lives in [facility] and will be moving back around the end of July.  Discussed in the future if pain continues and is consistent we could consider a cortisone injection.  Patient can follow-up as needed. All patient's questions were answered to his satisfaction.  This note is created using dictation transcription.  It may contain typographical errors, grammatical errors, improperly dictated words, background noise and other errors.

## 2025-07-02 ENCOUNTER — TELEPHONE (OUTPATIENT)
Dept: FAMILY MEDICINE CLINIC | Facility: HOSPITAL | Age: 65
End: 2025-07-02

## 2025-07-02 DIAGNOSIS — I47.10 PAROXYSMAL SVT (SUPRAVENTRICULAR TACHYCARDIA) (HCC): Primary | ICD-10-CM

## 2025-07-02 NOTE — TELEPHONE ENCOUNTER
Patient scheduled w/ you 7/8/25.    Had lipid, cbc, cmp done 6/25/25.    Currently has BMP and A1C ordered in chart.  Asking if you can add TSH which was last done 7/2024.    pcb

## 2025-07-03 ENCOUNTER — APPOINTMENT (OUTPATIENT)
Dept: LAB | Facility: HOSPITAL | Age: 65
End: 2025-07-03
Payer: COMMERCIAL

## 2025-07-03 ENCOUNTER — OFFICE VISIT (OUTPATIENT)
Dept: OBGYN CLINIC | Facility: CLINIC | Age: 65
End: 2025-07-03
Payer: COMMERCIAL

## 2025-07-03 VITALS — WEIGHT: 228 LBS | BODY MASS INDEX: 36.64 KG/M2 | HEIGHT: 66 IN

## 2025-07-03 DIAGNOSIS — Z12.5 PROSTATE CANCER SCREENING: ICD-10-CM

## 2025-07-03 DIAGNOSIS — N13.8 BPH WITH OBSTRUCTION/LOWER URINARY TRACT SYMPTOMS: ICD-10-CM

## 2025-07-03 DIAGNOSIS — N20.0 NEPHROLITHIASIS: ICD-10-CM

## 2025-07-03 DIAGNOSIS — N40.1 BPH WITH OBSTRUCTION/LOWER URINARY TRACT SYMPTOMS: ICD-10-CM

## 2025-07-03 DIAGNOSIS — R10.32 CHRONIC PAIN OF LEFT GROIN: Primary | ICD-10-CM

## 2025-07-03 DIAGNOSIS — G89.29 CHRONIC PAIN OF LEFT GROIN: Primary | ICD-10-CM

## 2025-07-03 DIAGNOSIS — R73.9 HYPERGLYCEMIA: ICD-10-CM

## 2025-07-03 LAB
ALBUMIN SERPL BCG-MCNC: 4.4 G/DL (ref 3.5–5)
ALP SERPL-CCNC: 74 U/L (ref 34–104)
ALT SERPL W P-5'-P-CCNC: 53 U/L (ref 7–52)
ANION GAP SERPL CALCULATED.3IONS-SCNC: 6 MMOL/L (ref 4–13)
AST SERPL W P-5'-P-CCNC: 33 U/L (ref 13–39)
BILIRUB SERPL-MCNC: 0.96 MG/DL (ref 0.2–1)
BUN SERPL-MCNC: 15 MG/DL (ref 5–25)
CALCIUM SERPL-MCNC: 8.9 MG/DL (ref 8.4–10.2)
CHLORIDE SERPL-SCNC: 103 MMOL/L (ref 96–108)
CO2 SERPL-SCNC: 30 MMOL/L (ref 21–32)
CREAT SERPL-MCNC: 1.1 MG/DL (ref 0.6–1.3)
EST. AVERAGE GLUCOSE BLD GHB EST-MCNC: 134 MG/DL
GFR SERPL CREATININE-BSD FRML MDRD: 70 ML/MIN/1.73SQ M
GLUCOSE P FAST SERPL-MCNC: 121 MG/DL (ref 65–99)
HBA1C MFR BLD: 6.3 %
POTASSIUM SERPL-SCNC: 4.2 MMOL/L (ref 3.5–5.3)
PROT SERPL-MCNC: 6.7 G/DL (ref 6.4–8.4)
PSA SERPL-MCNC: 0.45 NG/ML (ref 0–4)
SODIUM SERPL-SCNC: 139 MMOL/L (ref 135–147)
TSH SERPL DL<=0.05 MIU/L-ACNC: 1.77 UIU/ML (ref 0.45–4.5)

## 2025-07-03 PROCEDURE — 84153 ASSAY OF PSA TOTAL: CPT

## 2025-07-03 PROCEDURE — 36415 COLL VENOUS BLD VENIPUNCTURE: CPT

## 2025-07-03 PROCEDURE — 84443 ASSAY THYROID STIM HORMONE: CPT | Performed by: INTERNAL MEDICINE

## 2025-07-03 PROCEDURE — 99213 OFFICE O/P EST LOW 20 MIN: CPT | Performed by: ORTHOPAEDIC SURGERY

## 2025-07-03 PROCEDURE — 80053 COMPREHEN METABOLIC PANEL: CPT

## 2025-07-03 NOTE — ASSESSMENT & PLAN NOTE
Findings are consistent with left groin pain, possible inguinal hernia. Findings and treatment options discussed with patient. X-rays of the left hip were reviewed and radiologist report discussed with patient. Discussed prognosis of condition with patient. Discussed with patient upon exam and x-rays, I feel his pain is not coming from the hip. Due to having groin pain associated with bending over, heavy lifting, and bearing down, I feel the patient might have a hernia. I recommend patient see a general surgeon for further evaluation. Referral to general surgery given at today's visit. Follow up as needed. All patient's questions were answered to his satisfaction.  This note is created using dictation transcription.  It may contain typographical errors, grammatical errors, improperly dictated words, background noise and other errors.

## 2025-07-03 NOTE — PROGRESS NOTES
Assessment:     1. Chronic pain of left groin        Plan:     Problem List Items Addressed This Visit          Surgery/Wound/Pain    Chronic pain of left groin - Primary    Findings are consistent with left groin pain, possible inguinal hernia. Findings and treatment options discussed with patient. X-rays of the left hip were reviewed and radiologist report discussed with patient. Discussed prognosis of condition with patient. Discussed with patient upon exam and x-rays, I feel his pain is not coming from the hip. Due to having groin pain associated with bending over, heavy lifting, and bearing down, I feel the patient might have a hernia. I recommend patient see a general surgeon for further evaluation. Referral to general surgery given at today's visit. Follow up as needed. All patient's questions were answered to his satisfaction.  This note is created using dictation transcription.  It may contain typographical errors, grammatical errors, improperly dictated words, background noise and other errors.         Relevant Orders    Ambulatory Referral to General Surgery      Subjective:     Patient ID: Wally Croft is a 64 y.o. male.  Chief Complaint:  64 y.o. male presents today for evaluation for left hip pain. He reports having hip pain that started years ago without any specific injury. He notes the pain is intermittent. He feels the pain in the groin area.  He denied pain when he ambulates.  He states the most aggravating activity is if he is bending over and lifting some thing heavy, like a box of books. He notes the pain does not stop him from being able to walk or perform daily tasks. He was previously seen by Nabil Jean PA-C. He states he was told it might be a hernia and to follow up with his PCP or a general surgeon. He states he is able to live with the pain because it is intermittent, but just wants some further direction for the future. He denies any lower back pain or radiating symptoms such as  "numbness or tingling.     Allergy:  Allergies[1]  Medications:  all current active meds have been reviewed  Past Medical History:  Past Medical History[2]  Past Surgical History:  Past Surgical History[3]  Family History:  Family History[4]  Social History:  Social History     Substance and Sexual Activity   Alcohol Use Not Currently    Comment: social; occasional alcohol use     Social History     Substance and Sexual Activity   Drug Use No     Tobacco Use History[5]  Review of Systems   Constitutional:  Negative for chills and fever.   HENT:  Negative for ear pain and sore throat.    Eyes:  Negative for pain and visual disturbance.   Respiratory:  Negative for cough and shortness of breath.    Cardiovascular:  Negative for chest pain and palpitations.   Gastrointestinal:  Negative for abdominal pain and vomiting.   Genitourinary:  Negative for dysuria and hematuria.   Musculoskeletal:  Negative for arthralgias, back pain, gait problem and joint swelling.   Skin:  Negative for color change and rash.   Neurological:  Negative for seizures and syncope.   All other systems reviewed and are negative.        Objective:  BP Readings from Last 1 Encounters:   12/30/24 144/82      Wt Readings from Last 1 Encounters:   07/03/25 103 kg (228 lb)      BMI:   Estimated body mass index is 36.8 kg/m² as calculated from the following:    Height as of this encounter: 5' 6\" (1.676 m).    Weight as of this encounter: 103 kg (228 lb).  BSA:   Estimated body surface area is 2.11 meters squared as calculated from the following:    Height as of this encounter: 5' 6\" (1.676 m).    Weight as of this encounter: 103 kg (228 lb).   Physical Exam  Vitals and nursing note reviewed.   Constitutional:       Appearance: Normal appearance. He is well-developed.   HENT:      Head: Normocephalic and atraumatic.      Right Ear: External ear normal.      Left Ear: External ear normal.      Nose: Nose normal.     Eyes:      General: No scleral " icterus.     Extraocular Movements: Extraocular movements intact.      Conjunctiva/sclera: Conjunctivae normal.     Pulmonary:      Effort: Pulmonary effort is normal. No respiratory distress.     Musculoskeletal:      Cervical back: Neck supple.      Comments: See Ortho exam     Skin:     General: Skin is warm and dry.     Neurological:      General: No focal deficit present.      Mental Status: He is alert and oriented to person, place, and time.     Psychiatric:         Mood and Affect: Mood normal.         Behavior: Behavior normal.       Left Hip Exam     Tenderness   The patient is experiencing tenderness in the anterior (groin).    Range of Motion   Flexion:  120   External rotation:  60   Internal rotation: 20     Muscle Strength   Abduction: 5/5   Adduction: 5/5   Flexion: 5/5     Tests   FREDI: negative  Ajit: negative    Other   Erythema: absent  Scars: absent  Sensation: normal  Pulse: present    Comments:  Positive stinchfield  Tenderness with palpation over inguinal area           I have personally reviewed pertinent films in PACS and my interpretation is x-rays of left hip/pelvis demonstrates no acute fracture or dislocation. No osseous abnormalities.    Scribe Attestation      I,:  Charlie Kaur am acting as a scribe while in the presence of the attending physician.:       I,:  Sandra Cruz MD personally performed the services described in this documentation    as scribed in my presence.:                [1]   Allergies  Allergen Reactions    Ceftin [Cefuroxime] Other (See Comments)     Difficulty urinating    [2]   Past Medical History:  Diagnosis Date    Acute hepatitis A     Acute prostatitis     Angina pectoris (HCC)     CPAP (continuous positive airway pressure) dependence     GERD (gastroesophageal reflux disease)     History of radiation exposure to spine     histroy of exposure to radiation   [3]   Past Surgical History:  Procedure Laterality Date    CARDIAC ELECTROPHYSIOLOGY MAPPING AND  ABLATION      for svt    COLONOSCOPY      Complete Colonoscopy - 10 yrs, onset 7/31/2009; 7/9/2014 - grade 4 int hemorrhoids, mild diverticulosis in mid sigmoid colon, onset [id number]    CORONARY ANGIOPLASTY WITH STENT PLACEMENT      DENTAL SURGERY      ESOPHAGOGASTRODUODENOSCOPY  04/02/2015    Diagnostic EGD 4/2/15 - gastritis and duodenitis; Sm hiatal hernia, gastric polyp, onset 27 Jun 2012    PROSTATE BIOPSY  2015    needle biopsy    TONSILLECTOMY     [4]   Family History  Problem Relation Name Age of Onset    Hypertension Mother      Breast cancer Mother      Hyperlipidemia Mother      Osteoporosis Mother      Hypertension Father      Coronary artery disease Paternal Grandfather      Colon cancer Neg Hx      Colon polyps Neg Hx     [5]   Social History  Tobacco Use   Smoking Status Never   Smokeless Tobacco Never

## 2025-07-07 ENCOUNTER — EVALUATION (OUTPATIENT)
Dept: PHYSICAL THERAPY | Facility: CLINIC | Age: 65
End: 2025-07-07
Attending: ORTHOPAEDIC SURGERY
Payer: COMMERCIAL

## 2025-07-07 DIAGNOSIS — M75.42 IMPINGEMENT SYNDROME OF LEFT SHOULDER: ICD-10-CM

## 2025-07-07 PROCEDURE — 97162 PT EVAL MOD COMPLEX 30 MIN: CPT | Performed by: PHYSICAL THERAPIST

## 2025-07-07 PROCEDURE — 97110 THERAPEUTIC EXERCISES: CPT | Performed by: PHYSICAL THERAPIST

## 2025-07-07 NOTE — PROGRESS NOTES
PT Evaluation     Today's date: 2025  Patient name: Wally Croft  : 1960  MRN: [id number]  Referring provider: Sandra Cruz MD  Dx:   Encounter Diagnosis     ICD-10-CM    1. Impingement syndrome of left shoulder  M75.42 Ambulatory Referral to Physical Therapy                     Assessment  Impairments: abnormal coordination, abnormal muscle firing, abnormal muscle tone, abnormal or restricted ROM, abnormal movement, activity intolerance, impaired balance, impaired physical strength, pain with function and poor posture   Symptom irritability: moderate    Assessment details: Problem List:  1) Limited scapular mobility   2) + impingment signs    Wally Croft is a pleasant 64 y.o. male who presents with increased L shoulder pain consistent with referring diagnosis of shoulder impingement that leads to limited ability to perform ADLs and OH reaching consistent with nociceptive pain secondary to scapular motor control deficits resulting from FOOSH.  Clinically he demonstrates + scapular assistance and + speeds testing despite (-) apprehension signs and normal PROM despite limited AROM and RTC testing.  This suggests the need for improved motor control and neuromuscular re-education for OH reaching in order to return to pain-free movement.  No further referral appears necessary at this time based upon examination results.  I expect he will benefit from 8 weeks of PT to address mobility deficits in order to return to pain-free ADLs.        Comparable signs:  1) Pain with FIR  2) Pain with Flexion   Understanding of Dx/Px/POC: good     Prognosis: good    Goals  Short Term Goals (4 weeks)  1.) Establish independence with HEP  2.) Decrease subjective pain levels from NPRS at least to 2-5/10 at rest and with activity  3.) Improve L shoulder ROM at least 5-10 degrees into all planes to allow for improved ease of movement with less guarding    Long Term Goals (8 weeks)  1.) Improve L shoulder ROM to WNL in  all planes to restore normal movement with ADLs and function  2.) Improve L shoulder strength to 5/5 in all planes in order to return to pain-free ADLs and function  3.) Improve FOTO score at least to 75 points showing improved self reported disability   4.) No longer impingment signs     Plan  Patient would benefit from: PT eval and skilled physical therapy  Planned modality interventions: biofeedback, cryotherapy, electrical stimulation/Russian stimulation and TENS    Planned therapy interventions: abdominal trunk stabilization, activity modification, ADL retraining, ADL training, balance, balance/weight bearing training, behavior modification, body mechanics training, coordination, compression, flexibility, functional ROM exercises, gait training, graded activity, graded exercise, graded motor, home exercise program, IADL retraining, transfer training, therapeutic training, therapeutic exercise, therapeutic activities, stretching, strengthening, sensory integrative techniques, self care, postural training, patient/caregiver education, neuromuscular re-education, nerve gliding, muscle pump exercises, motor coordination training, massage, manual therapy, IASTM and joint mobilization  Speech planned therapy intervention: NMES    Frequency: 2x week  Duration in weeks: 8  Plan of Care beginning date: 7/7/2025  Plan of Care expiration date: 9/8/2025  Treatment plan discussed with: patient  Plan details: Initiate POC for stability; monitor sxs and progress as able         Subjective Evaluation    History of Present Illness  Date of onset: 3/20/2025  Mechanism of injury: Wally Croft is a 64 y.o. male who presents with increased L shoulder pain worse with Wbing through his L shoulder leading to increased neck pain.  Notes he was reaching forward reaching for an object but his chair slipped forward and he landed on an outstretched arm leading to pain and limited ability to move his arm for 2-3 days.  Notes his shoulder  pain subsided but then has had limited movement in his arm but still with 25% deficit with reaching behind or OH.  Notes that he is unable to sleep on his L arm.   Decided to seek out MD consult where X-rays were (-) for fx and script for OPPT provided.  Denies night pain or changes in bowel or bladder function.  Denies any NT signs or sxs.  No F/U with MD in 1 month. Denies an injection.   Wishes to return to [facility] pain-free- wishes to attain exercises to improve function before returning to [facility] at the end of the month.                 Not a recurrent problem   Quality of life: good    Patient Goals  Patient goals for therapy: decreased edema, decreased pain, improved balance, increased strength, independence with ADLs/IADLs, return to sport/leisure activities and increased motion  Patient goal: Get exercises and improve overall strength  Pain  Current pain ratin  At best pain ratin  At worst pain ratin  Location: L shoulder  Quality: dull ache and sharp  Relieving factors: relaxation and rest  Aggravating factors: overhead activity and lifting  Progression: improved    Social Support    Employment status: working  Hand dominance: right  Exercise history: Walk more      Diagnostic Tests  X-ray: normal  Treatments  Previous treatment: physical therapy  Current treatment: physical therapy        Objective     Cervical/Thoracic Screen   Cervical range of motion within normal limits  Cervical range of motion within normal limits with the following exceptions: (-) spurlings and sharp edgar     Neurological Testing     Sensation     Shoulder   Left Shoulder   Intact: light touch    Right Shoulder   Intact: Light touch    Reflexes   Left   Biceps (C5/C6): normal (2+)  Brachioradialis (C6): normal (2+)  Triceps (C7): normal (2+)  Thompson's reflex: negative    Right   Biceps (C5/C6): normal (2+)  Brachioradialis (C6): normal (2+)  Triceps (C7): normal (2+)  Thompson's reflex: negative    Active Range of Motion  "  Left Shoulder   Flexion: 80 degrees   Extension: 60 degrees   Abduction: 135 degrees   External rotation 0°: 51 degrees   Internal rotation BTB: sacrum   Horizontal adduction: 10 degrees     Right Shoulder   Flexion: 165 degrees   Extension: 63 degrees   Abduction: 161 degrees   External rotation 0°: 73 degrees   Internal rotation BTB: T7   Horizontal adduction: 13 degrees     Strength/Myotome Testing     Left Shoulder     Planes of Motion   Flexion: 5   Abduction: 4+   External rotation at 0°: 5   Internal rotation at 0°: 4+     Right Shoulder     Planes of Motion   Flexion: 5   Abduction: 4+   External rotation at 0°: 4+   Internal rotation at 0°: 4+     Tests     Left Shoulder   Positive painful arc, Speed's and scapular assistance test positive.   Negative active compression ([place]), empty can, external rotation lag sign, full can, Hawkin's and Neer's.             Daily Treatment Diary     Precautions: CAD, SVT, GERD, Psoriasis  CO-MORBIDITIES: CAD, SVT, Psoriasis  TO MD On:   FOTO Completed On:     POC Expires Reeval for Medicare to be completed  Unit Limit Auth Exiiration Date PT/OT/STVisit Limit   9/8/25 By visit NA NA 12/31/25 75    Completed on visit                    Auth Status DATE 7/7        Approved Visit # 1         Remaining 74        MANUAL THERAPY         L shoulder AP mobilization Gr 2-4         L shoulder PROM         Scapular rythmic initiation                                    THERAPEUTIC EXERCISE HEP         Scapular reset          Slouch overcorrection          T/S extension in chair          SA wall slide with assist 10x         SH extension  10x3\"         Isometric 4 way 10x3\"                                                                                                    NEUROMUSCULAR REEDUCATION                                                                                                                                                       THERAPEUTIC ACTIVITY                  "                                 GAIT TRAINING                                                  MODALITIES

## 2025-07-08 ENCOUNTER — OFFICE VISIT (OUTPATIENT)
Dept: FAMILY MEDICINE CLINIC | Facility: HOSPITAL | Age: 65
End: 2025-07-08
Payer: COMMERCIAL

## 2025-07-08 ENCOUNTER — OFFICE VISIT (OUTPATIENT)
Dept: PHYSICAL THERAPY | Facility: CLINIC | Age: 65
End: 2025-07-08
Attending: ORTHOPAEDIC SURGERY
Payer: COMMERCIAL

## 2025-07-08 VITALS
DIASTOLIC BLOOD PRESSURE: 79 MMHG | WEIGHT: 230.8 LBS | OXYGEN SATURATION: 96 % | SYSTOLIC BLOOD PRESSURE: 138 MMHG | HEART RATE: 56 BPM | BODY MASS INDEX: 37.09 KG/M2 | HEIGHT: 66 IN | TEMPERATURE: 97.3 F

## 2025-07-08 DIAGNOSIS — E78.5 DYSLIPIDEMIA: ICD-10-CM

## 2025-07-08 DIAGNOSIS — I47.10 PAROXYSMAL SVT (SUPRAVENTRICULAR TACHYCARDIA) (HCC): ICD-10-CM

## 2025-07-08 DIAGNOSIS — R73.03 PREDIABETES: Primary | ICD-10-CM

## 2025-07-08 DIAGNOSIS — I25.10 ATHEROSCLEROSIS OF NATIVE CORONARY ARTERY OF NATIVE HEART WITHOUT ANGINA PECTORIS: ICD-10-CM

## 2025-07-08 DIAGNOSIS — M75.42 IMPINGEMENT SYNDROME OF LEFT SHOULDER: Primary | ICD-10-CM

## 2025-07-08 DIAGNOSIS — E66.812 CLASS 2 SEVERE OBESITY DUE TO EXCESS CALORIES WITH SERIOUS COMORBIDITY AND BODY MASS INDEX (BMI) OF 37.0 TO 37.9 IN ADULT (HCC): ICD-10-CM

## 2025-07-08 DIAGNOSIS — E66.01 CLASS 2 SEVERE OBESITY DUE TO EXCESS CALORIES WITH SERIOUS COMORBIDITY AND BODY MASS INDEX (BMI) OF 37.0 TO 37.9 IN ADULT (HCC): ICD-10-CM

## 2025-07-08 PROCEDURE — 97140 MANUAL THERAPY 1/> REGIONS: CPT | Performed by: PHYSICAL THERAPIST

## 2025-07-08 PROCEDURE — 99214 OFFICE O/P EST MOD 30 MIN: CPT | Performed by: INTERNAL MEDICINE

## 2025-07-08 PROCEDURE — 97110 THERAPEUTIC EXERCISES: CPT | Performed by: PHYSICAL THERAPIST

## 2025-07-08 NOTE — ASSESSMENT & PLAN NOTE
S/p ablation, no recent CV symptoms noted, on beta blocker and just saw Cardio, call with CV concerns

## 2025-07-08 NOTE — PROGRESS NOTES
"Daily Note     Today's date: 2025  Patient name: Wally Croft  : 1960  MRN: [id number]  Referring provider: Sandra Cruz MD  Dx:   Encounter Diagnosis     ICD-10-CM    1. Impingement syndrome of left shoulder  M75.42                      Subjective: Reports having soreness after last session and discomfort with sleeping.       Objective: See treatment diary below      Assessment: Able to perform active flexion and ER with improved ability to initiate session.  Still with shrug signs and impingment noted with ABD.  Most pain 90 degrees and above.  Continues to have limited ability to perform PROM into ABD as well- up to 120 degrees. Post inferior Gr 3-4 mobilization was able to improve passive ABD with less discomfort.  Able to re-enforce ABD with AAROM table slide ABD and wall walk with improved ability.     Plan: Continue per plan of care.      Daily Treatment Diary     Precautions: CAD, SVT, GERD, Psoriasis  CO-MORBIDITIES: CAD, SVT, Psoriasis  TO MD On:   FOTO Completed On:     POC Expires Reeval for Medicare to be completed  Unit Limit Auth Exiiration Date PT/OT/STVisit Limit   25 By visit NA NA 25 75    Completed on visit                    Auth Status DATE        Approved Visit # 1 2        Remaining 74 73       MANUAL THERAPY         L shoulder AP mobilization Gr 2-4  10 min        L shoulder PROM  15 min        Scapular rythmic initiation                                    THERAPEUTIC EXERCISE HEP         Scapular reset   10x       Slouch overcorrection   NV       T/S extension in chair   NV       SA wall slide with assist 10x  2x10       SH extension  10x3\"  10x3\"       Isometric 4 way 10x3\"   10x3\" flex palms up       Table slide ABD   10x3\"       Wall walk ABD   10x                                                                             NEUROMUSCULAR REEDUCATION                                                                                                             "                                           THERAPEUTIC ACTIVITY                                                  GAIT TRAINING                                                  MODALITIES

## 2025-07-08 NOTE — ASSESSMENT & PLAN NOTE
Both FBS/A1C creeping up in upper prediabetes range, he was encouraged to con't watching his diet but to really start formal exercise, recheck BW in 6 mos - BW order given   Orders:    Ambulatory Referral to Nutrition Services; Future    Comprehensive metabolic panel; Future    Hemoglobin A1C; Future

## 2025-07-08 NOTE — PROGRESS NOTES
Name: Wally Croft      : 1960      MRN: [id number]  Encounter Provider: Nikia Ireland DO  Encounter Date: 2025   Encounter department: [facility] [address]   :  Assessment & Plan  Prediabetes  Both FBS/A1C creeping up in upper prediabetes range, he was encouraged to con't watching his diet but to really start formal exercise, recheck BW in 6 mos - BW order given   Orders:    Ambulatory Referral to Nutrition Services; Future    Comprehensive metabolic panel; Future    Hemoglobin A1C; Future    Dyslipidemia  LDL at goal, con't current statin and Zetia, recheck FLP in 1 yr       Atherosclerosis of native coronary artery of native heart without angina pectoris  No current CV symptoms, just saw Cardio, on beta blocker and statin, call with CV concerns, will follow       Paroxysmal SVT (supraventricular tachycardia) (HCC)  S/p ablation, no recent CV symptoms noted, on beta blocker and just saw Cardio, call with CV concerns        Class 2 severe obesity due to excess calories with serious comorbidity and body mass index (BMI) of 37.0 to 37.9 in adult (HCC)    Long discussion about watching carbs and starting formal exercise, encouraged nutrition eval - referral placed, will follow             Depression Screening and Follow-up Plan: Patient was screened for depression during today's encounter. They screened negative with a PHQ-2 score of 0.      Colonoscopy  - 10 yrs - pt reports he had one done in [place] the past year    PSA     Fasting BW  (A1C 6.3) - order given for 6 mos      History of Present Illness   HPI Pt here for follow up appt and BW results    BW results were d/w pt in detail: FBS/A1C 121/6.3, ALT 53, rest of CMP/TSH/PSA were wnl, FLP was done with Cardio  and was at goal    Def of nml vs IFG vs DM was d/w pt in detail. Diet/exercise was reviewed - wgt down 3 lbs from Dec 24.  He does not have a sweet tooth. He does not feel he has a carb rich  "diet.  He does no formal exercise.     Goal FLP was d/w pt in detail.  Diet/exercise reviewed as noted above.  He is taking his Atorvastatin and Zetia daily as directed w/o Se.  He notes no stroke/TIA symptoms/CP.     Pt saw Cardio in Dec 24 and June 25 for f/u CAD/SVT s/p ablation - OV notes reviewed.  He had no meds changed.  He was noting atypical CP at appt in Dec and was felt to not be cardiac.  Stress test was discussed but pt deferred. NO angina was noted at appt in June.  Plavix was stopped.  He remains on metoprolol succ and statin.  He was told to f/u in 6 mos.  He denies recent CP/palp/SOB/orthopnea. He has swelling in legs if he sits a long period of time.    Pt was seen by Ortho (Dr. Cruz) earlier this month for L shoulder and then again a few days later for L groin pain. He was referred to PT and had initial eval yesterday. He is not using anything for his pain as it has improved.  He was referred to general surgery for the groin pain as it was felt to be a poss hernia.       Review of Systems   Constitutional:  Negative for chills and fever.   HENT:  Negative for congestion and sore throat.    Eyes:  Negative for pain and visual disturbance.   Respiratory:  Negative for cough and shortness of breath.    Cardiovascular:  Positive for leg swelling. Negative for chest pain and palpitations.   Gastrointestinal:  Negative for abdominal pain, blood in stool, constipation, diarrhea, nausea and vomiting.   Genitourinary:  Negative for difficulty urinating and dysuria.   Musculoskeletal:  Positive for arthralgias. Negative for gait problem.   Skin:  Negative for rash and wound.   Neurological:  Negative for dizziness and headaches.   Hematological:  Does not bruise/bleed easily.   Psychiatric/Behavioral:  Negative for confusion.        Objective   /79 (BP Location: Left arm, Patient Position: Sitting, Cuff Size: Large)   Pulse 56   Temp (!) 97.3 °F (36.3 °C)   Ht 5' 6\" (1.676 m)   Wt 105 kg (230 lb " 12.8 oz)   SpO2 96%   BMI 37.25 kg/m²      Physical Exam  Vitals and nursing note reviewed.   Constitutional:       General: He is not in acute distress.     Appearance: He is well-developed. He is obese. He is not ill-appearing.   HENT:      Head: Normocephalic and atraumatic.      Right Ear: External ear normal.      Left Ear: External ear normal.     Eyes:      General:         Right eye: No discharge.         Left eye: No discharge.      Conjunctiva/sclera: Conjunctivae normal.     Neck:      Trachea: No tracheal deviation.     Cardiovascular:      Rate and Rhythm: Normal rate and regular rhythm.      Heart sounds: Normal heart sounds. No murmur heard.  Pulmonary:      Effort: Pulmonary effort is normal. No respiratory distress.      Breath sounds: Normal breath sounds. No wheezing, rhonchi or rales.   Abdominal:      General: There is no distension.      Palpations: Abdomen is soft.      Tenderness: There is no abdominal tenderness. There is no guarding or rebound.     Musculoskeletal:         General: No signs of injury.      Cervical back: Neck supple.      Right lower leg: No edema.      Left lower leg: No edema.     Skin:     General: Skin is warm and dry.      Coloration: Skin is not pale.      Findings: No rash.     Neurological:      General: No focal deficit present.      Mental Status: He is alert. Mental status is at baseline.      Motor: No abnormal muscle tone.      Gait: Gait normal.     Psychiatric:         Behavior: Behavior normal.         Thought Content: Thought content normal.         Judgment: Judgment normal.

## 2025-07-08 NOTE — ASSESSMENT & PLAN NOTE
{If prescribing weight loss medication, click here to fill out prior auth smartform and then hit F2 with this smartlist to insert prior auth documentation (Optional):[id number]}  Long discussion about watching carbs and starting formal exercise, encouraged nutrition eval - referral placed, will follow

## 2025-07-08 NOTE — ASSESSMENT & PLAN NOTE
No current CV symptoms, just saw Cardio, on beta blocker and statin, call with CV concerns, will follow

## 2025-07-10 ENCOUNTER — CONSULT (OUTPATIENT)
Dept: SURGERY | Facility: CLINIC | Age: 65
End: 2025-07-10
Attending: ORTHOPAEDIC SURGERY
Payer: COMMERCIAL

## 2025-07-10 VITALS
HEIGHT: 66 IN | DIASTOLIC BLOOD PRESSURE: 70 MMHG | WEIGHT: 226.4 LBS | OXYGEN SATURATION: 94 % | TEMPERATURE: 97 F | BODY MASS INDEX: 36.38 KG/M2 | HEART RATE: 65 BPM | SYSTOLIC BLOOD PRESSURE: 130 MMHG
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
## 2025-07-10 DIAGNOSIS — G89.29 CHRONIC PAIN OF LEFT GROIN: ICD-10-CM

## 2025-07-10 DIAGNOSIS — R10.32 CHRONIC PAIN OF LEFT GROIN: ICD-10-CM

## 2025-07-10 DIAGNOSIS — S39.91XA ATHLETIC PUBALGIA OF LEFT INGUINAL REGION: Primary | ICD-10-CM

## 2025-07-10 PROCEDURE — 99202 OFFICE O/P NEW SF 15 MIN: CPT | Performed by: SURGERY

## 2025-07-10 NOTE — PROGRESS NOTES
"Name: Wally Croft      : 1960      MRN: [id number]  Encounter Provider:  Francisco Farah MD  Encounter Date: 7/10/2025   Encounter department: [facility] GENERAL SURGERY [place]  :  Assessment & Plan  Chronic pain of left groin    Orders:    Ambulatory Referral to General Surgery    Athletic pubalgia of left inguinal region  Patient educated about the disease process.  Patient has no hernia.  Patient's symptomatology is very mild.  Patient advised to take pain medications when needed.     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 male who presents with left groin pain.    HPI  Patient states that he has had intermittent pain in his left groin for more than 15 years.  This may have started after he had left femoral approach cardiac catheterization.  Pain usually is mild in intensity and tends to be intermittent.  There is no specific activity which exacerbates this pain.  He does not describe any difficulty with lifting or straining.  He has never felt a lump in his groin.    Review of Systems as per HPI.  No urinary or bowel complaints    Pertinent Medical History   Coronary artery disease            Objective     /70 (Patient Position: Sitting, Cuff Size: Standard)   Pulse 65   Temp (!) 97 °F (36.1 °C) (Tympanic)   Ht 5' 6\" (1.676 m)   Wt 103 kg (226 lb 6.4 oz)   SpO2 94%   BMI 36.54 kg/m²      Physical Exam   Patient was examined in a standing position    Patient has no groin hernia.  The external genitalia are normal.  Upon finger invagination of the left groin there is tenderness around the area of the left pubic tubercle.        "

## 2025-07-11 ENCOUNTER — OFFICE VISIT (OUTPATIENT)
Dept: UROLOGY | Facility: MEDICAL CENTER | Age: 65
End: 2025-07-11
Payer: COMMERCIAL

## 2025-07-11 VITALS
HEART RATE: 60 BPM | WEIGHT: 227.2 LBS | SYSTOLIC BLOOD PRESSURE: 142 MMHG | DIASTOLIC BLOOD PRESSURE: 82 MMHG | OXYGEN SATURATION: 95 % | BODY MASS INDEX: 36.52 KG/M2 | HEIGHT: 66 IN

## 2025-07-11 DIAGNOSIS — N40.1 BPH WITH OBSTRUCTION/LOWER URINARY TRACT SYMPTOMS: Primary | ICD-10-CM

## 2025-07-11 DIAGNOSIS — N13.8 BPH WITH OBSTRUCTION/LOWER URINARY TRACT SYMPTOMS: Primary | ICD-10-CM

## 2025-07-11 DIAGNOSIS — Z12.5 PROSTATE CANCER SCREENING: ICD-10-CM

## 2025-07-11 LAB
SL AMB  POCT GLUCOSE, UA: ABNORMAL
SL AMB LEUKOCYTE ESTERASE,UA: ABNORMAL
SL AMB POCT BILIRUBIN,UA: ABNORMAL
SL AMB POCT BLOOD,UA: ABNORMAL
SL AMB POCT CLARITY,UA: CLEAR
SL AMB POCT COLOR,UA: ABNORMAL
SL AMB POCT KETONES,UA: 15
SL AMB POCT NITRITE,UA: ABNORMAL
SL AMB POCT PH,UA: 5.5
SL AMB POCT SPECIFIC GRAVITY,UA: 1.02
SL AMB POCT URINE PROTEIN: ABNORMAL
SL AMB POCT UROBILINOGEN: 0.2

## 2025-07-11 PROCEDURE — 81003 URINALYSIS AUTO W/O SCOPE: CPT

## 2025-07-11 PROCEDURE — 99213 OFFICE O/P EST LOW 20 MIN: CPT

## 2025-07-11 NOTE — PROGRESS NOTES
7/11/2025      Assessment and Plan    64 y.o. male managed by Dr. Alvarado    Prostate cancer screening  Patient denies any known family history for prostate cancer  Patient's last PSA was performed 7/3/2025 and returned normal at a value of 0.452.  Refer to PSA trend below.  JULIANA performed today's office visit.  Probably benign prostate.  Reassured the patient that his JULIANA in the office today was unremarkable.  Reviewed his most recent PSA and discussed that remains very stable.  Recommend repeating the PSA in 1 year.    Lab Results   Component Value Date    PSA 0.452 07/03/2025    PSA 0.564 07/02/2024    PSA 0.417 07/01/2024            History of Present Illness  Wally Croft is a 64 y.o. male here for evaluation of BPH with obstruction/lower urinary tract symptoms, nephrolithiasis, and prostate cancer screening.  Patient was last seen in the office on 7/9/2024.    Patient's last PSA was performed 7/3/2025 and returned normal at a value of 0.452.    Patient previously reported some degree of a weakened urinary stream and slight urgency, but is currently not on any pharmacotherapy for treatment of lower urinary tract symptoms.    Patient's last upper tract imaging with an ultrasound of the kidney and bladder in 2018 did not reveal any kidney stones.    Today, patient reports no new lower urinary tract complaints.  Patient is currently satisfied with his voiding pattern and reports an intermittent weakened urinary stream.  Patient notes that he will have increased nocturia upwards of 3-4 times a night if he drinks fluids up to bedtime.  However, if he does not and restrict his fluid intake then he may only wake up once per night.  Otherwise, patient offers no other lower urinary tract complaints today's office visit.    Review of Systems   Constitutional:  Negative for chills and fever.   HENT:  Negative for ear pain and sore throat.    Eyes:  Negative for pain and visual disturbance.   Respiratory:  Negative for  "cough and shortness of breath.    Cardiovascular:  Negative for chest pain and palpitations.   Gastrointestinal:  Negative for abdominal pain and vomiting.   Genitourinary:  Negative for decreased urine volume, difficulty urinating, dysuria, flank pain, frequency, hematuria and urgency.   Musculoskeletal:  Negative for arthralgias and back pain.   Skin:  Negative for color change and rash.   Neurological:  Negative for seizures and syncope.   All other systems reviewed and are negative.               Vitals  Vitals:    07/11/25 1410   BP: 142/82   BP Location: Left arm   Patient Position: Sitting   Cuff Size: Large   Pulse: 60   SpO2: 95%   Weight: 103 kg (227 lb 3.2 oz)   Height: 5' 6\" (1.676 m)       Physical Exam  Vitals reviewed.   Constitutional:       General: He is not in acute distress.     Appearance: Normal appearance. He is not ill-appearing.   HENT:      Head: Normocephalic and atraumatic.      Nose: Nose normal.     Eyes:      General: No scleral icterus.    Pulmonary:      Effort: No respiratory distress.   Abdominal:      General: Abdomen is flat. There is no distension.      Palpations: Abdomen is soft.      Tenderness: There is no abdominal tenderness.     Musculoskeletal:         General: Normal range of motion.      Cervical back: Normal range of motion.     Skin:     General: Skin is warm.      Coloration: Skin is not jaundiced.     Neurological:      Mental Status: He is alert and oriented to person, place, and time.      Gait: Gait normal.     Psychiatric:         Mood and Affect: Mood normal.         Behavior: Behavior normal.           Past History  Past Medical History[1]  Social History[2]  Tobacco Use History[3]  Family History[4]    The following portions of the patient's history were reviewed and updated as appropriate: allergies, current medications, past medical history, past social history, past surgical history and problem list.    Results  No results found for this or any previous " visit (from the past hour).]  Lab Results   Component Value Date    PSA 0.452 07/03/2025    PSA 0.564 07/02/2024    PSA 0.417 07/01/2024    PSA 0.39 06/27/2023     Lab Results   Component Value Date    GLUCOSE 97 07/14/2015    CALCIUM 8.9 07/03/2025     07/14/2015    K 4.2 07/03/2025    CO2 30 07/03/2025     07/03/2025    BUN 15 07/03/2025    CREATININE 1.10 07/03/2025     Lab Results   Component Value Date    WBC 6.46 07/02/2024    HGB 15.9 07/02/2024    HCT 46.0 07/02/2024    MCV 91 07/02/2024     07/02/2024             [1]   Past Medical History:  Diagnosis Date    Acute hepatitis A     Acute prostatitis     Angina pectoris (HCC)     CPAP (continuous positive airway pressure) dependence     GERD (gastroesophageal reflux disease)     History of radiation exposure to spine     histroy of exposure to radiation   [2]   Social History  Socioeconomic History    Marital status: /Civil Union   Occupational History    Occupation:  - full-time   Tobacco Use    Smoking status: Never    Smokeless tobacco: Never   Vaping Use    Vaping status: Never Used   Substance and Sexual Activity    Alcohol use: Not Currently     Comment: social; occasional alcohol use    Drug use: No    Sexual activity: Yes     Social Drivers of Health     Intimate Partner Violence: Not At Risk (9/18/2023)    Received from [facility]    Humiliation, Afraid, Rape, and Kick questionnaire     Fear of Current or Ex-Partner: No     Emotionally Abused: No     Physically Abused: No     Sexually Abused: No   [3]   Social History  Tobacco Use   Smoking Status Never   Smokeless Tobacco Never   [4]   Family History  Problem Relation Name Age of Onset    Hypertension Mother      Breast cancer Mother      Hyperlipidemia Mother      Osteoporosis Mother      Hypertension Father      Coronary artery disease Paternal Grandfather      Colon cancer Neg Hx      Colon polyps Neg Hx

## 2025-07-11 NOTE — ASSESSMENT & PLAN NOTE
Patient denies any known family history for prostate cancer  Patient's last PSA was performed 7/3/2025 and returned normal at a value of 0.452.  Refer to PSA trend below.  JULIANA performed today's office visit.  Probably benign prostate.  Reassured the patient that his JULIANA in the office today was unremarkable.  Reviewed his most recent PSA and discussed that remains very stable.  Recommend repeating the PSA in 1 year.    Lab Results   Component Value Date    PSA 0.452 07/03/2025    PSA 0.564 07/02/2024    PSA 0.417 07/01/2024

## 2025-07-22 ENCOUNTER — CLINICAL SUPPORT (OUTPATIENT)
Age: 65
End: 2025-07-22
Attending: INTERNAL MEDICINE
Payer: COMMERCIAL

## 2025-07-22 DIAGNOSIS — R73.03 PREDIABETES: ICD-10-CM

## 2025-07-22 PROCEDURE — 97802 MEDICAL NUTRITION INDIV IN: CPT

## 2025-07-22 NOTE — PROGRESS NOTES
" Nutrition Assessment Form    Patient Name: Wally Croft    YOB: 1960    Sex: Male     Assessment Date: 7/22/2025  Start Time: 230 Stop Time: 315 Total Minutes: 45     Data:  Present at session: self   Parent/Patient Concerns/reason for visit: \"preDM, wants to make changes\"   Medical Dx/Reason for Referral: preDM   Past Medical History[1]    Current Medications[2]     Additional Meds/Supplements: N/A   Special Learning Needs/barriers to learning/any new barriers N/A   Height: Ht Readings from Last 5 Encounters:   07/11/25 5' 6\" (1.676 m)   07/10/25 5' 6\" (1.676 m)   07/08/25 5' 6\" (1.676 m)   07/03/25 5' 6\" (1.676 m)   07/01/25 5' 6\" (1.676 m)      Weight: Wt Readings from Last 10 Encounters:   07/11/25 103 kg (227 lb 3.2 oz)   07/10/25 103 kg (226 lb 6.4 oz)   07/08/25 105 kg (230 lb 12.8 oz)   07/03/25 103 kg (228 lb)   07/01/25 103 kg (228 lb)   12/30/24 106 kg (233 lb 6.4 oz)   07/16/24 101 kg (223 lb)   07/09/24 101 kg (223 lb)   07/03/24 100 kg (221 lb)   10/02/23 99.3 kg (219 lb)     Estimated body mass index is 36.67 kg/m² as calculated from the following:    Height as of 7/11/25: 5' 6\" (1.676 m).    Weight as of 7/11/25: 103 kg (227 lb 3.2 oz).   Recent Weight Change: Yes     [x]No  Amount: Stated he gained about 11-12lbs in past year      Energy Needs: 1612-1935kcals (25-30kcals/kg IBW)77-90g (1.2-1.4g/kg) 1612-1935ml (1ml/kcal or per MD)   Allergies[3] or intolerances NKFA   Social History     Substance and Sexual Activity   Alcohol Use Not Currently    Comment: social; occasional alcohol use       Tobacco Use History[4]    Who shops? patient   Who cooks/cooking methods/Eating out/take out habits   patient  Cooking methods: bake/xiong/air xiong/grill/boil/other________    Take out: __1_ x/ month while in [place]; 3 times/day while in [place]  Dining out ____ x/wk or month   Exercise: Stopped exercising in past year. Used to walk 1-1.25hours 5-6 days a week, do not know distance     Other: ie: " Sleep habits/ stress level/ work habits household-lives with ?/ food security Pt has CLIFF, uses cpap, does not nap. Sleeps about 8 hours, does sleep better in [place]. Stress 6/10 here and does complain of stress in [place] as well. Does admit to eating out of stress   Prior Nutritional Counseling? []Yes     [x]No  When:      Why:         Diet Hx:  Breakfast: Diet: 5am-1/2 cup egg whites, steel cut oats, 1.5 cup berries, [place] water   Lunch: 1030-2pm. Rotisserie chicken, cheddar cheese, sandwich thins         Dinner: 530-630. Protein, starch, veggies. Likes sweet potatoes, beans, fruit.         Snacks: AM -   PM - used to eat chocolate but has cut out. Stopped drinking regular coke.  HS - popcorn   Other Notes/ Initial Assessment:  Pt presents for nutrition referral for PreDM. Pt has recently made a lot of changes and is really trying to cut back on carbs in his diet. Explained that CHO are important and necessary we just want to make sure he is balancing intake at meals.     Discussed the importance of a healthy lifestyle and it's impact on overall health, inlcuding adequate sleep, consistent activity, healthy stress management techniques, importance of regular consistent food, portioning foods, front loading calories as able and adequate fluids throughout the day.  Practice mindful eating. Be sure to set aside time to eat, eat slowly (20 mins/meal), and savor your food.      Provided patient plate method and portion control for DM, 1800kcal sample menu, weight loss tips    Will follow up PRN when he returns over the holidays.    Updated assessment (Follow up note only):           Nutrition Diagnosis:   Food and nutrition related knowledge deficit  related to Lack of prior exposure to accurate nutrition related information as evidenced by No prior knowledge of need for food and nutrition related recommendations       Any change or new dx since previous visit:     Nutrition Diagnosis:         Medical Nutrition Therapy  Intervention:  [x]Individualized Meal Plan Discussed the plate method of portioning foods, including half a plate fruits and vegetables or a half plate all vegetables, 1/4 of the plate a lean protein source or meat, and a 1/4 of the plate being a whole grain carb- usually 1/2-1c.  This should be followed for at least 2 meals of the day, but could also be followed for all 3. Maintain hydration by drinking small amounts of clear fluids frequently, then soft diet, and then advance diet as tolerated. May use OTC Imodium if desired for any diarrhea.  Call if symptoms worsen, high fever, severe weakness or fainting, increased abdominal pain, blood in stool or vomit, or failure to improve in 2-3 days. Discussed the importance of eating 3 meals daily and not skipping, and how metabolism is affected.  Also discussed adding in small snacks or 5-6 small meals daily if desired vs 3 larger ones, and appropriate options and portions.  Appropriate timing of meals, including eating within 1 hr of waking and eating meals slowly 20mins/meals, minimizing mindless/boredom or habitual eating, etc was also mentioned.  []Understanding Lab Values   []Basic Pathophysiology of Disease []Food/Medication Interactions   []Food Diary [x]Exercise   Studies have shown that the ideal exercise goal is somewhere between 150 to 300 minutes of moderate intensity exercise a week.  Start with exercising 10 minutes every other day and gradually increase physical activity with a goal of at least 150 minutes of moderate intensity exercise a week, divided over at least 3 days a week.  An example of this would be exercising 30 minutes a day, 5 days a week.  Moderate intensity means you should be slightly out of breath, but still able to hold a conversation.  Resistance/ weight bearing training can increase muscle mass and increase our resting metabolic rate.   FULL BODY resistance training is recommended 2-3 times a week.  Do not do this on consecutive days  to allow for muscle recovery.      Discussed importance of activity throughout the lifecycle and its impact on overall health/stress management, etc.     Aim for a bare minimum 5000 steps, even on days you do not exercise.      [x]Lifestyle/Behavior Modification Techniques Discussed the importance of adequate sleep, and ideal sleeping conditions, including a cool temperature 64-68 degrees F, and a dark and quiet room. Also discussed the importance of a regular and consistent sleep routine, including minimizing blue light exposure an hour before sleeping.  Weekend habits should include staying fairly consistent with weekday sleep habits to minimize disruption during the week.  A connection was made between getting adequate and good quality sleep and the ability to handle stress the next day, make healthy food choices, and be active. Discussed healthy stress management, techniques including regular physical activity as able, meditation, hobbies, etc.  Also discussed the impact stress has on health, wt, hormones and overall health.  []Medication, Mechanism of Action   []Label Reading: CHO/ Na/ Fat/ other_________ []Self Blood Glucose Monitoring   [x]Weight/BMI Goals: gain/lose/maintain Short term goal of 2-4# x 1 month or next visit. Initial weight loss goal of 5-10% weight loss for improved health as studies have shown this is where we see the greatest impact on improving health and decreasing risk of obesity related conditions.  Weight loss can improve patient's co-morbid conditions and/or prevent weight-related complications.  []Other -           Comprehension: []Excellent  [x]Very Good  []Good  []Fair   []Poor    Receptivity: []Excellent  [x]Very Good  []Good  []Fair   []Poor    Expected Compliance: []Excellent  [x]Very Good  []Good  []Fair   []Poor        Goals (initial)/ Progress made on previous goals/new goals:  Aim to consume 45-60g CHO/meal   2.    Aim to lose 5 lbs over two months   3.    Increase physical  "activity to 30min/day 5 days/week       No follow-ups on file.  Labs:  CMP  Lab Results   Component Value Date     07/14/2015    K 4.2 07/03/2025     07/03/2025    CO2 30 07/03/2025    ANIONGAP 8 07/14/2015    BUN 15 07/03/2025    CREATININE 1.10 07/03/2025    GLUCOSE 97 07/14/2015    GLUF 121 (H) 07/03/2025    CALCIUM 8.9 07/03/2025    AST 33 07/03/2025    ALT 53 (H) 07/03/2025    ALKPHOS 74 07/03/2025    PROT 7.2 07/14/2015    BILITOT 0.9 07/14/2015    EGFR 70 07/03/2025       BMP  Lab Results   Component Value Date    GLUCOSE 97 07/14/2015    CALCIUM 8.9 07/03/2025     07/14/2015    K 4.2 07/03/2025    CO2 30 07/03/2025     07/03/2025    BUN 15 07/03/2025    CREATININE 1.10 07/03/2025       Lipids  Lab Results   Component Value Date    CHOL 92 07/14/2015     Lab Results   Component Value Date    HDL 35 (L) 07/02/2024    HDL 37 (L) 07/15/2019    HDL 43 07/06/2017     Lab Results   Component Value Date    LDLCALC 39 07/02/2024    LDLCALC 41 07/15/2019    LDLCALC 55 07/06/2017     Lab Results   Component Value Date    TRIG 53 07/02/2024    TRIG 90 07/15/2019    TRIG 86 07/06/2017     No results found for: \"CHOLHDL\"    Hemoglobin A1C  Lab Results   Component Value Date    HGBA1C 6.3 (H) 07/03/2025       Fasting Glucose  Lab Results   Component Value Date    GLUF 121 (H) 07/03/2025       Insulin     Thyroid  No results found for: \"TSH\", \"O1KNWKF\", \"A1YULFM\", \"THYROIDAB\"    Hepatic Function Panel  Lab Results   Component Value Date    ALT 53 (H) 07/03/2025    AST 33 07/03/2025    ALKPHOS 74 07/03/2025    BILITOT 0.9 07/14/2015       Celiac Disease Antibody Panel  No results found for: \"ENDOMYSIAL IGA\", \"GLIADIN IGA\", \"GLIADIN IGG\", \"IGA\", \"TISSUE TRANSGLUT AB\", \"TTG IGA\"   Iron  No results found for: \"IRON\", \"TIBC\", \"FERRITIN\"         Cha Cosme  [facility]  [address] [place] [address] " [id number]  [phone]           [1]   Past Medical History:  Diagnosis Date    Acute hepatitis A     Acute prostatitis     Angina pectoris (HCC)     CPAP (continuous positive airway pressure) dependence     GERD (gastroesophageal reflux disease)     History of radiation exposure to spine     histroy of exposure to radiation   [2]   Current Outpatient Medications   Medication Sig Dispense Refill    aspirin 81 MG tablet Take 1 tablet by mouth in the morning.      atorvastatin (LIPITOR) 40 mg tablet Take by mouth      ezetimibe (ZETIA) 10 mg tablet Take 1 tablet by mouth in the morning.      fexofenadine (ALLEGRA) 180 MG tablet Take 1 tablet by mouth daily as needed (Allergies)      metoprolol succinate (TOPROL-XL) 25 mg 24 hr tablet       metoprolol succinate (TOPROL-XL) 50 mg 24 hr tablet Take 75 mg by mouth in the morning.  0    nitroglycerin (NITROSTAT) 0.4 mg SL tablet Place 0.4 mg under the tongue every 5 (five) minutes as needed      omeprazole (PRILOSEC) 20 mg delayed release capsule Take by mouth      Vitamin D, Ergocalciferol, 2000 units CAPS Take 2,000 Units by mouth in the morning.       No current facility-administered medications for this visit.   [3]   Allergies  Allergen Reactions    Ceftin [Cefuroxime] Other (See Comments)     Difficulty urinating     Pollen Extract Cough   [4]   Social History  Tobacco Use   Smoking Status Never   Smokeless Tobacco Never

## 2025-07-23 ENCOUNTER — OFFICE VISIT (OUTPATIENT)
Dept: PHYSICAL THERAPY | Facility: CLINIC | Age: 65
End: 2025-07-23
Attending: ORTHOPAEDIC SURGERY
Payer: COMMERCIAL

## 2025-07-23 DIAGNOSIS — M75.42 IMPINGEMENT SYNDROME OF LEFT SHOULDER: Primary | ICD-10-CM

## 2025-07-23 PROCEDURE — 97140 MANUAL THERAPY 1/> REGIONS: CPT | Performed by: PHYSICAL THERAPIST

## 2025-07-23 PROCEDURE — 97110 THERAPEUTIC EXERCISES: CPT | Performed by: PHYSICAL THERAPIST

## 2025-07-23 NOTE — PROGRESS NOTES
"Daily Note     Today's date: 2025  Patient name: Wally Croft  : 1960  MRN: [id number]  Referring provider: Sandra Cruz MD  Dx:   Encounter Diagnosis     ICD-10-CM    1. Impingement syndrome of left shoulder  M75.42                      Subjective: Reports feeling continued improvement in his L shoulder.  ROM is improved overall but still getting pain with lifting OH.      Objective: See treatment diary below      Assessment: Noting improved AROM in all planes compared to IR.  Pain with resisted ABD and flexion.  Able to perform STD flexion and  press as well as performance of no money and SA wall slides, LPD. PROM still pain with end range flexion ~ 150 degrees.  Improved with AP mobilization.      Plan: Continue per plan of care.      Daily Treatment Diary     Precautions: CAD, SVT, GERD, Psoriasis  CO-MORBIDITIES: CAD, SVT, Psoriasis  TO MD On:   FOTO Completed On:     POC Expires Reeval for Medicare to be completed  Unit Limit Auth Exiiration Date PT/OT/STVisit Limit   25 By visit NA NA 25 75    Completed on visit                    Auth Status DATE       Approved Visit # 1 2 3       Remaining 74 [id number]      MANUAL THERAPY         L shoulder AP mobilization Gr 2-4  10 min  10 min      L shoulder PROM  15 min  15 min       Scapular rythmic initiation                                    THERAPEUTIC EXERCISE HEP         Scapular reset   10x       Slouch overcorrection   NV       T/S extension in chair   NV       SA wall slide with assist 10x  2x10 2x10      SH extension  10x3\"  10x3\" 10x3\"      Isometric 4 way 10x3\"   10x3\" flex palms up       Table slide ABD   10x3\"       Wall walk ABD   10x       Table push up    2x5      STD flex    2# 2x10       press    2@ 2x10      LPD    65# 2x10      No money    GTB 20x                                    NEUROMUSCULAR REEDUCATION                                                                                             "                                                           THERAPEUTIC ACTIVITY                                                  GAIT TRAINING                                                  MODALITIES

## 2025-07-25 ENCOUNTER — EVALUATION (OUTPATIENT)
Dept: PHYSICAL THERAPY | Facility: CLINIC | Age: 65
End: 2025-07-25
Attending: ORTHOPAEDIC SURGERY
Payer: COMMERCIAL

## 2025-07-25 DIAGNOSIS — M75.42 IMPINGEMENT SYNDROME OF LEFT SHOULDER: Primary | ICD-10-CM

## 2025-07-25 PROCEDURE — 97140 MANUAL THERAPY 1/> REGIONS: CPT | Performed by: PHYSICAL THERAPIST

## 2025-07-25 NOTE — PROGRESS NOTES
PT Re-Evaluation  and PT Discharge    Today's date: 2025  Patient name: Wally Croft  : 1960  MRN: [id number]  Referring provider: Sandra Cruz MD  Dx:   Encounter Diagnosis     ICD-10-CM    1. Impingement syndrome of left shoulder  M75.42                      Assessment  Impairments: abnormal coordination, abnormal muscle firing, abnormal muscle tone, abnormal or restricted ROM, abnormal movement, activity intolerance, impaired balance, impaired physical strength, pain with function and poor posture   Symptom irritability: moderate    Assessment details: Problem List:  1) Limited scapular mobility   2) + impingment signs    Wally Croft is a pleasant 64 y.o. male who presents with increased L shoulder pain consistent with referring diagnosis of shoulder impingement that leads to limited ability to perform ADLs and OH reaching consistent with nociceptive pain secondary to scapular motor control deficits resulting from FOOSH.  Clinically he demonstrates + scapular assistance and + speeds testing despite (-) apprehension signs and normal PROM despite limited AROM and RTC testing.  This suggests the need for improved motor control and neuromuscular re-education for OH reaching in order to return to pain-free movement.  No further referral appears necessary at this time based upon examination results.  I expect he will benefit from 8 weeks of PT to address mobility deficits in order to return to pain-free ADLs.        Comparable signs:  1) Pain with FIR  2) Pain with Flexion     Re-assessment 25:  Wally has attended 4 visits since the initiation of PT and reports a 85% GROC.  Clinically demonstrates gross improvements in L shoulder AROM, strength and special testing now able to perform functional ADLs without major difficulty but still with weakenss with L shoulder ABD suggesting the need for continued strength despite his return to [facility].  As a result he will be D/C[place]ed [place] leaving [place].   Understanding of Dx/Px/POC: good     Prognosis: good    Goals  Short Term Goals (4 weeks)  1.) Establish independence with HEP- MET  2.) Decrease subjective pain levels from NPRS at least to 2-5/10 at rest and with activity- MET  3.) Improve L shoulder ROM at least 5-10 degrees into all planes to allow for improved ease of movement with less guarding-MET    Long Term Goals (8 weeks)  1.) Improve L shoulder ROM to WNL in all planes to restore normal movement with ADLs and function- MET  2.) Improve L shoulder strength to 5/5 in all planes in order to return to pain-free ADLs and function- partailly mET  3.) Improve FOTO score at least to 75 points showing improved self reported disability- MET   4.) No longer impingment signs - MET    Plan  Patient would benefit from: PT eval and skilled physical therapy  Planned modality interventions: biofeedback, cryotherapy, electrical stimulation/Russian stimulation and TENS    Planned therapy interventions: abdominal trunk stabilization, activity modification, ADL retraining, ADL training, balance, balance/weight bearing training, behavior modification, body mechanics training, coordination, compression, flexibility, functional ROM exercises, gait training, graded activity, graded exercise, graded motor, home exercise program, IADL retraining, transfer training, therapeutic training, therapeutic exercise, therapeutic activities, stretching, strengthening, sensory integrative techniques, self care, postural training, patient/caregiver education, neuromuscular re-education, nerve gliding, muscle pump exercises, motor coordination training, massage, manual therapy, IASTM and joint mobilization  Speech planned therapy intervention: NMES    Frequency: 2x week  Duration in weeks: 8  Plan of Care beginning date: 7/7/2025  Plan of Care expiration date: 9/8/2025  Treatment plan discussed with: patient  Plan details: D/C        Subjective Evaluation    History of Present  Illness  Date of onset: 3/20/2025  Mechanism of injury: Wally Croft is a 64 y.o. male who presents with increased L shoulder pain worse with Wbing through his L shoulder leading to increased neck pain.  Notes he was reaching forward reaching for an object but his chair slipped forward and he landed on an outstretched arm leading to pain and limited ability to move his arm for 2-3 days.  Notes his shoulder pain subsided but then has had limited movement in his arm but still with 25% deficit with reaching behind or OH.  Notes that he is unable to sleep on his L arm.   Decided to seek out MD consult where X-rays were (-) for fx and script for OPPT provided.  Denies night pain or changes in bowel or bladder function.  Denies any NT signs or sxs.  No F/U with MD in 1 month. Denies an injection.   Wishes to return to [facility] pain-free- wishes to attain exercises to improve function before returning to [facility] at the end of the month.       Re-assessment 25:  Wally has attended 4 visits since the initiation of PT and reports 85% GROC.  Notes he is able to move his arm OH now and reach behind to shower and wash his hair now.  Able to perform ADLs without difficulty.  Able to perform a seatbelt fasening with improved ability and don a shirt with less pain.  Notes that moving posterior is still painful for him. Other than that feels able to continue independently and will be D/C'ed as he returns to [facility].           Not a recurrent problem   Quality of life: good    Patient Goals  Patient goals for therapy: decreased edema, decreased pain, improved balance, increased strength, independence with ADLs/IADLs, return to sport/leisure activities and increased motion  Patient goal: Get exercises and improve overall strength  Pain  Current pain ratin  At best pain ratin  At worst pain ratin  Location: L shoulder  Quality: dull ache and sharp  Relieving factors: relaxation and rest  Aggravating factors: overhead  activity and lifting  Progression: improved    Social Support    Employment status: working  Hand dominance: right  Exercise history: Walk more      Diagnostic Tests  X-ray: normal  Treatments  Previous treatment: physical therapy  Current treatment: physical therapy        Objective     Cervical/Thoracic Screen   Cervical range of motion within normal limits  Cervical range of motion within normal limits with the following exceptions: (-) spurlings and sharp edgar     Neurological Testing     Sensation     Shoulder   Left Shoulder   Intact: light touch    Right Shoulder   Intact: Light touch    Reflexes   Left   Biceps (C5/C6): normal (2+)  Brachioradialis (C6): normal (2+)  Triceps (C7): normal (2+)  Thompson's reflex: negative    Right   Biceps (C5/C6): normal (2+)  Brachioradialis (C6): normal (2+)  Triceps (C7): normal (2+)  Thompson's reflex: negative    Active Range of Motion   Left Shoulder   Flexion: 167 degrees   Extension: 60 degrees   Abduction: 155 degrees   External rotation 0°: 77 degrees   Internal rotation BTB: L2   Horizontal adduction: 22 degrees     Right Shoulder   Flexion: 165 degrees   Extension: 63 degrees   Abduction: 161 degrees   External rotation 0°: 73 degrees   Internal rotation BTB: T7   Horizontal adduction: 13 degrees     Strength/Myotome Testing     Left Shoulder     Planes of Motion   Flexion: 5   Abduction: 4+   External rotation at 0°: 5   Internal rotation at 0°: 5     Right Shoulder     Planes of Motion   Flexion: 5   Abduction: 4+   External rotation at 0°: 4+   Internal rotation at 0°: 4+     Tests     Left Shoulder   Positive painful arc, Speed's and scapular assistance test positive.   Negative active compression ([place]), empty can, external rotation lag sign, full can, Hawkin's and Neer's.             Daily Treatment Diary     Precautions: CAD, SVT, GERD, Psoriasis  CO-MORBIDITIES: CAD, SVT, Psoriasis  TO MD On:   FOTO Completed On:     POC Expires Reeval for Medicare to  "be completed  Unit Limit Auth Exiiration Date PT/OT/STVisit Limit   9/8/25 By visit NA NA 12/31/25 75    Completed on visit                    Auth Status DATE 7/7 7/8 7/23 7/25     Approved Visit # 1 2 3 4      Remaining [phone]     MANUAL THERAPY         L shoulder AP mobilization Gr 2-4  10 min  10 min 10 min      L shoulder PROM  15 min  15 min       Scapular rythmic initiation         Re-assessment    15 min                        THERAPEUTIC EXERCISE HEP         Scapular reset   10x       Slouch overcorrection   NV       T/S extension in chair   NV       SA wall slide with assist 10x  2x10 2x10      SH extension  10x3\"  10x3\" 10x3\"      Isometric 4 way 10x3\"   10x3\" flex palms up       Table slide ABD   10x3\"       Wall walk ABD   10x       Table push up    2x5      STD flex    2# 2x10       press    2@ 2x10      LPD    65# 2x10      No money    GTB 20x                                    NEUROMUSCULAR REEDUCATION                                                                                                                                                       THERAPEUTIC ACTIVITY                                                  GAIT TRAINING                                                  MODALITIES                                          "